# Patient Record
Sex: FEMALE | Race: WHITE | HISPANIC OR LATINO | Employment: FULL TIME | URBAN - METROPOLITAN AREA
[De-identification: names, ages, dates, MRNs, and addresses within clinical notes are randomized per-mention and may not be internally consistent; named-entity substitution may affect disease eponyms.]

---

## 2019-01-29 ENCOUNTER — APPOINTMENT (OUTPATIENT)
Dept: RADIOLOGY | Facility: MEDICAL CENTER | Age: 29
End: 2019-01-29
Attending: EMERGENCY MEDICINE
Payer: MEDICAID

## 2019-01-29 ENCOUNTER — HOSPITAL ENCOUNTER (EMERGENCY)
Facility: MEDICAL CENTER | Age: 29
End: 2019-01-29
Attending: EMERGENCY MEDICINE
Payer: MEDICAID

## 2019-01-29 VITALS
OXYGEN SATURATION: 99 % | SYSTOLIC BLOOD PRESSURE: 104 MMHG | TEMPERATURE: 98.4 F | HEIGHT: 61 IN | HEART RATE: 80 BPM | RESPIRATION RATE: 16 BRPM | BODY MASS INDEX: 20.56 KG/M2 | DIASTOLIC BLOOD PRESSURE: 65 MMHG | WEIGHT: 108.91 LBS
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
## 2019-01-29 DIAGNOSIS — S20.222A CONTUSION OF UPPER BACK, LEFT, INITIAL ENCOUNTER: ICD-10-CM

## 2019-01-29 DIAGNOSIS — S40.012A CONTUSION OF LEFT SHOULDER, INITIAL ENCOUNTER: ICD-10-CM

## 2019-01-29 PROCEDURE — 99284 EMERGENCY DEPT VISIT MOD MDM: CPT

## 2019-01-29 PROCEDURE — 73030 X-RAY EXAM OF SHOULDER: CPT | Mod: LT

## 2019-01-29 PROCEDURE — A9270 NON-COVERED ITEM OR SERVICE: HCPCS | Performed by: EMERGENCY MEDICINE

## 2019-01-29 PROCEDURE — 71046 X-RAY EXAM CHEST 2 VIEWS: CPT

## 2019-01-29 PROCEDURE — 700102 HCHG RX REV CODE 250 W/ 637 OVERRIDE(OP): Performed by: EMERGENCY MEDICINE

## 2019-01-29 RX ORDER — IBUPROFEN 600 MG/1
600 TABLET ORAL ONCE
Status: COMPLETED | OUTPATIENT
Start: 2019-01-29 | End: 2019-01-29

## 2019-01-29 RX ORDER — IBUPROFEN 600 MG/1
600 TABLET ORAL EVERY 6 HOURS PRN
Qty: 30 TAB | Refills: 0 | Status: SHIPPED | OUTPATIENT
Start: 2019-01-29

## 2019-01-29 RX ADMIN — IBUPROFEN 600 MG: 600 TABLET ORAL at 14:17

## 2019-01-29 ASSESSMENT — LIFESTYLE VARIABLES: DO YOU DRINK ALCOHOL: NO

## 2019-01-29 NOTE — ED TRIAGE NOTES
Chief Complaint   Patient presents with   • Fall     hit mid back on sink counter   • Back Pain     mid   • Shoulder Pain     left     Pt ambulated to triage, per pt she slipped and fell hitting her back on sink counter. No loc.

## 2019-01-29 NOTE — ED NOTES
Pt to and from xray in stable condition. Pt medicated per MAR.   
Reviewed discharge instructions, pt verbalized understanding of instructions and medication. States she will  meds as rx'd. Denies further questions at this time. Pt ambulatory out of ER with stable gait.     
right wrist pain/yes (describe)

## 2019-01-29 NOTE — ED PROVIDER NOTES
ED Provider Note    Scribed for Lay Horvath M.D. by Marvel Edwards. 1/29/2019  1:31 PM    Primary care provider: Pcp Pt States None  Means of arrival: Walk in  History obtained from: Patient  History limited by: None     CHIEF COMPLAINT  Chief Complaint   Patient presents with   • Fall     hit mid back on sink counter   • Back Pain     mid   • Shoulder Pain     left       HPI  Marika Melendez is a 28 y.o. female who presents to the Emergency Department for evaluation of left shoulder pain and left upper back pain onset 2 days ago. She states the pain began following a fall when she was standing on the edge of a tub, slipped, and hit her back on the sink. The patient reports no head trauma or loss of consciousness at that time. She has not taken any Motrin or iced the area for alleviation. The patient denies associated limited range of motion.     REVIEW OF SYSTEMS  Musculoskeletal: Positive for left shoulder pain and left upper back pain. Negative for limited range of motion.     See history of present illness. E.    PAST MEDICAL HISTORY   has a past medical history of Prenatal care insufficient with unknown SALO (5/3/2016).    SURGICAL HISTORY   has a past surgical history that includes edin by laparoscopy (10/30/2012); tonsillectomy; and primary c section (5/20/2016).    SOCIAL HISTORY  Social History   Substance Use Topics   • Smoking status: Never Smoker   • Smokeless tobacco: Never Used   • Alcohol use No      History   Drug Use   • Types: Methamphetamines, Inhaled     Comment: Last used 1/2016       FAMILY HISTORY  History reviewed. No pertinent family history.    CURRENT MEDICATIONS  Home Medications     Reviewed by Tala Mccoy R.N. (Registered Nurse) on 01/29/19 at 1250  Med List Status: Complete   Medication Last Dose Status        Patient Jaison Taking any Medications                       ALLERGIES  No Known Allergies    PHYSICAL EXAM  VITAL SIGNS: /67   Pulse 85   Temp 36.7 °C (98.1 °F)  "(Temporal)   Resp 16   Ht 1.549 m (5' 1\")   Wt 49.4 kg (108 lb 14.5 oz)   LMP 01/29/2019   SpO2 100%   BMI 20.58 kg/m²     Constitutional: Well developed, Well nourished, No acute distress, Non-toxic appearance.   HEENT: Normocephalic, Atraumatic,  external ears normal, pharynx pink,  Mucous  Membranes moist, No rhinorrhea or mucosal edema  Eyes: PERRL, EOMI, Conjunctiva normal, No discharge.   Neck: Normal range of motion, No tenderness, Supple, No stridor.   Lymphatic: No lymphadenopathy    Cardiovascular: Regular Rate and Rhythm, No murmurs,  rubs, or gallops.   Thorax & Lungs: Lungs clear to auscultation bilaterally, No respiratory distress, No wheezes, rhales or rhonchi, No chest wall tenderness or crepitus.   Abdomen: Bowel sounds normal, Soft, non tender, non distended,  No pulsatile masses., no rebound guarding or peritoneal signs.   Skin: Warm, Dry, No erythema, No rash, No contusions or abrasions.   Extremities: Equal, intact distal pulses, No cyanosis, No tenderness.   Musculoskeletal: Good range of motion in all major joints. No tenderness to palpation or major deformities noted. Tenderness over left scapula area, normal range of motion, non tender TLS spine.  Neurologic: Alert & awake, no focal deficits. Normal  strength.   Psychiatric: Affect normal      DIAGNOSTIC STUDIES / PROCEDURES    RADIOLOGY  DX-SHOULDER 2+ LEFT   Final Result      1.  There is no acute displaced fracture of the left shoulder.      DX-CHEST-2 VIEWS   Final Result      Unremarkable two view chest.        The radiologist's interpretation of all radiological studies have been reviewed by me.    COURSE & MEDICAL DECISION MAKING  Nursing notes, VS, PMSFHx reviewed in chart.     1:31 PM - Patient seen and examined at bedside. Patient will be treated with Motrin 600 mg. Ordered DX-Shoulder 2+ left and DX-Chest 2 views to evaluate her symptoms.     2:27 PM - Patient was reevaluated at bedside. She is resting comfortably with " stable vital signs. Discussed radiology results with the patient that show no sign of fracture. The patient was made aware her symptoms are consistent with a contusion. She will be prescribed Motrin for discharge and recommended to get plenty of rest. The patient is understanding and agreeable to discharge.       The patient will return for new or worsening symptoms and is stable at the time of discharge.    DISPOSITION:  Patient will be discharged home in stable condition.    FOLLOW UP:  [address] [address] [phone]  Call in 1 day  to establish care, for recheck      OUTPATIENT MEDICATIONS:  Discharge Medication List as of 1/29/2019  2:38 PM      START taking these medications    Details   ibuprofen (MOTRIN) 600 MG Tab Take 1 Tab by mouth every 6 hours as needed., Disp-30 Tab, R-0, Normal             FINAL IMPRESSION  1. Fall, initial encounter    2. Contusion of left shoulder, initial encounter    3. Contusion of upper back, left, initial encounter          Marvel RAHMAN (Asael), am scribing for, and in the presence of, Lay Horvath M.D..    Electronically signed by: Marvel Edwards (Asael), 1/29/2019    Lay RAHMAN M.D. personally performed the services described in this documentation, as scribed by Marvel Edwards in my presence, and it is both accurate and complete.    E.    The note accurately reflects work and decisions made by me.  Lay Horvath  1/29/2019  5:11 PM

## 2019-02-22 ENCOUNTER — APPOINTMENT (OUTPATIENT)
Dept: RADIOLOGY | Facility: MEDICAL CENTER | Age: 29
End: 2019-02-22
Attending: EMERGENCY MEDICINE
Payer: MEDICAID

## 2019-02-22 ENCOUNTER — HOSPITAL ENCOUNTER (EMERGENCY)
Facility: MEDICAL CENTER | Age: 29
End: 2019-02-23
Attending: EMERGENCY MEDICINE
Payer: MEDICAID

## 2019-02-22 DIAGNOSIS — J18.9 PNEUMONIA OF BOTH LUNGS DUE TO INFECTIOUS ORGANISM, UNSPECIFIED PART OF LUNG: ICD-10-CM

## 2019-02-22 LAB
ALBUMIN SERPL BCP-MCNC: 4.3 G/DL (ref 3.2–4.9)
ALBUMIN/GLOB SERPL: 1.3 G/DL
ALP SERPL-CCNC: 54 U/L (ref 30–99)
ALT SERPL-CCNC: 25 U/L (ref 2–50)
ANION GAP SERPL CALC-SCNC: 9 MMOL/L (ref 0–11.9)
APPEARANCE UR: ABNORMAL
AST SERPL-CCNC: 22 U/L (ref 12–45)
BACTERIA #/AREA URNS HPF: ABNORMAL /HPF
BASOPHILS # BLD AUTO: 0.2 % (ref 0–1.8)
BASOPHILS # BLD: 0.04 K/UL (ref 0–0.12)
BILIRUB SERPL-MCNC: 1.4 MG/DL (ref 0.1–1.5)
BILIRUB UR QL STRIP.AUTO: ABNORMAL
BUN SERPL-MCNC: 12 MG/DL (ref 8–22)
CALCIUM SERPL-MCNC: 9 MG/DL (ref 8.5–10.5)
CHLORIDE SERPL-SCNC: 103 MMOL/L (ref 96–112)
CO2 SERPL-SCNC: 23 MMOL/L (ref 20–33)
COLOR UR: ABNORMAL
CREAT SERPL-MCNC: 0.61 MG/DL (ref 0.5–1.4)
D DIMER PPP IA.FEU-MCNC: <0.4 UG/ML (FEU) (ref 0–0.5)
EKG IMPRESSION: NORMAL
EOSINOPHIL # BLD AUTO: 0.01 K/UL (ref 0–0.51)
EOSINOPHIL NFR BLD: 0.1 % (ref 0–6.9)
EPI CELLS #/AREA URNS HPF: ABNORMAL /HPF
ERYTHROCYTE [DISTWIDTH] IN BLOOD BY AUTOMATED COUNT: 38.8 FL (ref 35.9–50)
GLOBULIN SER CALC-MCNC: 3.2 G/DL (ref 1.9–3.5)
GLUCOSE SERPL-MCNC: 116 MG/DL (ref 65–99)
GLUCOSE UR STRIP.AUTO-MCNC: NEGATIVE MG/DL
HCG UR QL: NEGATIVE
HCT VFR BLD AUTO: 40.7 % (ref 37–47)
HGB BLD-MCNC: 14.1 G/DL (ref 12–16)
HYALINE CASTS #/AREA URNS LPF: ABNORMAL /LPF
IMM GRANULOCYTES # BLD AUTO: 0.04 K/UL (ref 0–0.11)
IMM GRANULOCYTES NFR BLD AUTO: 0.2 % (ref 0–0.9)
KETONES UR STRIP.AUTO-MCNC: 40 MG/DL
LEUKOCYTE ESTERASE UR QL STRIP.AUTO: ABNORMAL
LIPASE SERPL-CCNC: 22 U/L (ref 11–82)
LYMPHOCYTES # BLD AUTO: 1.34 K/UL (ref 1–4.8)
LYMPHOCYTES NFR BLD: 7.8 % (ref 22–41)
MCH RBC QN AUTO: 31.1 PG (ref 27–33)
MCHC RBC AUTO-ENTMCNC: 34.6 G/DL (ref 33.6–35)
MCV RBC AUTO: 89.8 FL (ref 81.4–97.8)
MICRO URNS: ABNORMAL
MONOCYTES # BLD AUTO: 0.62 K/UL (ref 0–0.85)
MONOCYTES NFR BLD AUTO: 3.6 % (ref 0–13.4)
NEUTROPHILS # BLD AUTO: 15.23 K/UL (ref 2–7.15)
NEUTROPHILS NFR BLD: 88.1 % (ref 44–72)
NITRITE UR QL STRIP.AUTO: NEGATIVE
NRBC # BLD AUTO: 0 K/UL
NRBC BLD-RTO: 0 /100 WBC
PH UR STRIP.AUTO: 6 [PH]
PLATELET # BLD AUTO: 197 K/UL (ref 164–446)
PMV BLD AUTO: 11.4 FL (ref 9–12.9)
POTASSIUM SERPL-SCNC: 3.5 MMOL/L (ref 3.6–5.5)
PROT SERPL-MCNC: 7.5 G/DL (ref 6–8.2)
PROT UR QL STRIP: 30 MG/DL
RBC # BLD AUTO: 4.53 M/UL (ref 4.2–5.4)
RBC # URNS HPF: ABNORMAL /HPF
RBC UR QL AUTO: ABNORMAL
SODIUM SERPL-SCNC: 135 MMOL/L (ref 135–145)
SP GR UR REFRACTOMETRY: 1.03
SP GR UR STRIP.AUTO: 1.03
UROBILINOGEN UR STRIP.AUTO-MCNC: 1 MG/DL
WBC # BLD AUTO: 17.3 K/UL (ref 4.8–10.8)
WBC #/AREA URNS HPF: ABNORMAL /HPF

## 2019-02-22 PROCEDURE — 93005 ELECTROCARDIOGRAM TRACING: CPT | Performed by: EMERGENCY MEDICINE

## 2019-02-22 PROCEDURE — 96375 TX/PRO/DX INJ NEW DRUG ADDON: CPT

## 2019-02-22 PROCEDURE — 96365 THER/PROPH/DIAG IV INF INIT: CPT

## 2019-02-22 PROCEDURE — 81001 URINALYSIS AUTO W/SCOPE: CPT

## 2019-02-22 PROCEDURE — 700111 HCHG RX REV CODE 636 W/ 250 OVERRIDE (IP): Performed by: EMERGENCY MEDICINE

## 2019-02-22 PROCEDURE — 81025 URINE PREGNANCY TEST: CPT

## 2019-02-22 PROCEDURE — 99285 EMERGENCY DEPT VISIT HI MDM: CPT

## 2019-02-22 PROCEDURE — 85025 COMPLETE CBC W/AUTO DIFF WBC: CPT

## 2019-02-22 PROCEDURE — 85379 FIBRIN DEGRADATION QUANT: CPT

## 2019-02-22 PROCEDURE — 76775 US EXAM ABDO BACK WALL LIM: CPT

## 2019-02-22 PROCEDURE — 93005 ELECTROCARDIOGRAM TRACING: CPT

## 2019-02-22 PROCEDURE — 87491 CHLMYD TRACH DNA AMP PROBE: CPT

## 2019-02-22 PROCEDURE — 71046 X-RAY EXAM CHEST 2 VIEWS: CPT

## 2019-02-22 PROCEDURE — 700105 HCHG RX REV CODE 258: Performed by: EMERGENCY MEDICINE

## 2019-02-22 PROCEDURE — 36415 COLL VENOUS BLD VENIPUNCTURE: CPT

## 2019-02-22 PROCEDURE — 83690 ASSAY OF LIPASE: CPT

## 2019-02-22 PROCEDURE — 87086 URINE CULTURE/COLONY COUNT: CPT

## 2019-02-22 PROCEDURE — 80053 COMPREHEN METABOLIC PANEL: CPT

## 2019-02-22 PROCEDURE — 87591 N.GONORRHOEAE DNA AMP PROB: CPT

## 2019-02-22 RX ORDER — KETOROLAC TROMETHAMINE 30 MG/ML
15 INJECTION, SOLUTION INTRAMUSCULAR; INTRAVENOUS ONCE
Status: COMPLETED | OUTPATIENT
Start: 2019-02-22 | End: 2019-02-22

## 2019-02-22 RX ORDER — SODIUM CHLORIDE 9 MG/ML
1000 INJECTION, SOLUTION INTRAVENOUS ONCE
Status: COMPLETED | OUTPATIENT
Start: 2019-02-22 | End: 2019-02-22

## 2019-02-22 RX ADMIN — CEFTRIAXONE SODIUM 1 G: 1 INJECTION, POWDER, FOR SOLUTION INTRAMUSCULAR; INTRAVENOUS at 22:26

## 2019-02-22 RX ADMIN — FENTANYL CITRATE 50 MCG: 50 INJECTION, SOLUTION INTRAMUSCULAR; INTRAVENOUS at 20:55

## 2019-02-22 RX ADMIN — SODIUM CHLORIDE 1000 ML: 9 INJECTION, SOLUTION INTRAVENOUS at 20:55

## 2019-02-22 RX ADMIN — KETOROLAC TROMETHAMINE 15 MG: 30 INJECTION INTRAMUSCULAR; INTRAVENOUS at 20:55

## 2019-02-22 ASSESSMENT — PAIN DESCRIPTION - DESCRIPTORS
DESCRIPTORS: ACHING;MISERABLE
DESCRIPTORS: MISERABLE

## 2019-02-23 VITALS
OXYGEN SATURATION: 99 % | BODY MASS INDEX: 19.55 KG/M2 | SYSTOLIC BLOOD PRESSURE: 94 MMHG | TEMPERATURE: 98.5 F | WEIGHT: 106.26 LBS | RESPIRATION RATE: 16 BRPM | DIASTOLIC BLOOD PRESSURE: 58 MMHG | HEART RATE: 77 BPM | HEIGHT: 62 IN

## 2019-02-23 LAB
C TRACH DNA SPEC QL NAA+PROBE: NEGATIVE
N GONORRHOEA DNA SPEC QL NAA+PROBE: NEGATIVE
SPECIMEN SOURCE: NORMAL

## 2019-02-23 PROCEDURE — 700117 HCHG RX CONTRAST REV CODE 255: Performed by: EMERGENCY MEDICINE

## 2019-02-23 PROCEDURE — 74177 CT ABD & PELVIS W/CONTRAST: CPT

## 2019-02-23 RX ORDER — BENZONATATE 100 MG/1
100 CAPSULE ORAL 3 TIMES DAILY PRN
Qty: 21 CAP | Refills: 0 | Status: SHIPPED | OUTPATIENT
Start: 2019-02-23 | End: 2019-03-02

## 2019-02-23 RX ORDER — AZITHROMYCIN 250 MG/1
TABLET, FILM COATED ORAL
Qty: 6 TAB | Refills: 0 | Status: SHIPPED | OUTPATIENT
Start: 2019-02-23

## 2019-02-23 RX ADMIN — IOHEXOL 100 ML: 350 INJECTION, SOLUTION INTRAVENOUS at 00:04

## 2019-02-23 NOTE — DISCHARGE INSTRUCTIONS
You have pneumonia. Fill your antibiotic prescription and cough medication prescription this morning.  It is important to hydrate with clear fluids such as water or Gatorade.  Avoid caffeine, alcohol, tobacco or any smoking, or any recreational drugs.  Finish your antibiotics even if you feel better first.  Return to the emergency department for worsening symptoms, if you are not gradually improving over the next couple of days.

## 2019-02-23 NOTE — ED TRIAGE NOTES
"Marika Melendez  Chief Complaint   Patient presents with   • Cough   • Flank Pain   • Abdominal Pain     Pt w/c to triage with above complaint. Pt states she had cough for approx 1.5 wks now. Pt reports dry, non-productive cough. Pt states this morning after waking up, she had abd pain and RIGHT flank pain. Pt states now pain is increased significantly upon inspiration and when coughing. Pt tachycardic at 120, EKG done in triage. Bilateral BP taken, RUE 94/49 and LUE 94/62.    BP (!) 94/58   Pulse (!) 120   Temp 36.9 °C (98.5 °F) (Temporal)   Resp 16   Ht 1.575 m (5' 2\")   Wt 48.2 kg (106 lb 4.2 oz)   LMP 02/22/2019   SpO2 96%   BMI 19.44 kg/m²     Pt informed of triage process and encouraged to notify staff of any changes or concerns. Pt verbalized understanding of instructions. Apologized for long wait time. Pt placed back in lobby.     "

## 2019-02-23 NOTE — ED NOTES
Discharge instructions reviewed. Questions answered. Pt aware rx's sent to pharmacy. Pt provided with work noted. Ambulatory to exit.

## 2019-02-23 NOTE — ED PROVIDER NOTES
"ED Provider Note    Scribed for Vicente Erickson M.D. by Vicente Erickson. 2/22/2019,  8:32 PM.    CHIEF COMPLAINT  Chief Complaint   Patient presents with   • Cough   • Flank Pain   • Abdominal Pain       HPI  Marika Melendez is a 28 y.o. female who presents to the Emergency Department  for right upper abdominal and flank pain, onset after about a week and a half of a dry, nonproductive cough.  She reports that the flank and belly pain started this morning after she woke up.  She says the pain is increased significantly throughout the day, and is made worse by deep inspiration and when coughing.  She does report strong smelling urine, but no dysuria.  She has not had fevers.  She denies daily medications or medication allergies.  She did not have relief at home from ibuprofen.    REVIEW OF SYSTEMS  See HPI for further details. All other systems are negative.     PAST MEDICAL HISTORY   has a past medical history of Prenatal care insufficient with unknown SALO (5/3/2016).    SOCIAL HISTORY  Social History     Social History Main Topics   • Smoking status: Never Smoker   • Smokeless tobacco: Never Used   • Alcohol use No   • Drug use: Yes     Types: Methamphetamines, Inhaled      Comment: Last used 1/2016   • Sexual activity: Not on file     History   Drug Use   • Types: Methamphetamines, Inhaled     Comment: Last used 1/2016       SURGICAL HISTORY   has a past surgical history that includes edin by laparoscopy (10/30/2012); tonsillectomy; and primary c section (5/20/2016).    CURRENT MEDICATIONS  Home Medications     Reviewed by Lamar Galvez R.N. (Registered Nurse) on 02/22/19 at 1911  Med List Status: <None>   Medication Last Dose Status   ibuprofen (MOTRIN) 600 MG Tab  Active                ALLERGIES  No Known Allergies    PHYSICAL EXAM  VITAL SIGNS: BP (!) 94/58   Pulse 77   Temp 36.9 °C (98.5 °F) (Temporal)   Resp 16   Ht 1.575 m (5' 2\")   Wt 48.2 kg (106 lb 4.2 oz)   LMP 02/22/2019   SpO2 99%   " BMI 19.44 kg/m²   Pulse ox interpretation: I interpret this pulse ox as normal.  Constitutional: Alert in n moderate distress.  Appears uncomfortable.    HENT: No signs of trauma, Bilateral external ears normal, Nose normal.   Eyes: Conjunctiva normal, Non-icteric.   Neck: Normal range of motion, Supple, No stridor.   Lymphatic: No lymphadenopathy noted.   Cardiovascular: Regular rate and rhythm, no murmurs.   Thorax & Lungs: Occasional weak dry cough.  Lung sounds are clear, but decreased on the right compared to the left, especially at the right base.  Abdomen: Bowel sounds normal, Soft, No tenderness, No masses, No pulsatile masses. No peritoneal signs.  Skin: Warm, Dry, No erythema, No rash.   Back: No CVA tenderness.  Extremities: Intact distal pulses, No edema, No cyanosis.  Musculoskeletal: Good range of motion in all major joints. No or major deformities noted.   Neurologic: Alert , Normal motor function, Normal sensory function, No focal deficits noted.   Psychiatric: Affect anxious, judgment normal, Mood normal.     DIAGNOSTIC STUDIES / PROCEDURES    LABS  Labs Reviewed   CBC WITH DIFFERENTIAL - Abnormal; Notable for the following:        Result Value    WBC 17.3 (*)     Neutrophils-Polys 88.10 (*)     Lymphocytes 7.80 (*)     Neutrophils (Absolute) 15.23 (*)     All other components within normal limits   COMP METABOLIC PANEL - Abnormal; Notable for the following:     Potassium 3.5 (*)     Glucose 116 (*)     All other components within normal limits   URINALYSIS,CULTURE IF INDICATED - Abnormal; Notable for the following:     Character Cloudy (*)     Ketones 40 (*)     Protein 30 (*)     Bilirubin Small (*)     Leukocyte Esterase Trace (*)     Occult Blood Small (*)     All other components within normal limits   URINE MICROSCOPIC (W/UA) - Abnormal; Notable for the following:     WBC 20-50 (*)     RBC 2-5 (*)     Bacteria Moderate (*)     Hyaline Cast 3-5 (*)     All other components within normal limits    LIPASE   HCG QUALITATIVE UR   ESTIMATED GFR   REFRACTOMETER SG   URINE CULTURE(NEW)   CHLAMYDIA/GC PCR URINE OR SWAB   D-DIMER     All labs reviewed by me.    RADIOLOGY  CT-ABDOMEN-PELVIS WITH   Final Result         1.  Bilateral pulmonary infiltrates, greater on the right   2.  Prominent vascular structures surrounding the uterus, appearance suggests pelvic congestion syndrome.   3.  Hepatomegaly      US-RENAL   Final Result         1.  Normal renal ultrasound.      DX-CHEST-2 VIEWS   Final Result         1.  No acute cardiopulmonary disease.        The radiologist's interpretation of all radiological studies have been reviewed by me.    COURSE & MEDICAL DECISION MAKING  Nursing notes, VS, PMSFHx reviewed in chart.     8:32 PM Patient seen and examined at bedside. Differential diagnosis includes but is not limited to pneumonia, pneumothorax, PE, pancreatitis, gastritis, constipation, not cholecystitis due to history of cholecystectomy. Ordered for labs, per protocol were ordered from triage, and I have added an x-ray to evaluate decreased right sided breath sounds. Patient will be treated with Toradol and fentanyl for her symptoms.     10:38 PM Other than a leukocytosis of 17, the patient's labs are fairly unremarkable, including a negative d-dimer.  Her urinalysis does show significant evidence of infection.  Though her heart rate has normalized, she did seem to be in significant discomfort initially, with a moderate to severe tachycardia, and I do not have a definitive diagnosis at this point, so I think a CT of the abdomen and pelvis is indicated, and I have ordered it.    HYDRATION: Based on the patient's presentation of Tachycardia the patient was given IV fluids. IV Hydration was used because oral hydration was not as rapid as required. Upon recheck following hydration, the patient was improved, with normalized heart rate..     This patient's CT of the abdomen and pelvis actually demonstrated pneumonia.   This is consistent with the patient's cough.  I think her right-sided upper abdominal and flank pain is consistent with diaphragmatic irritation secondary to the CT proven pneumonia.  She has never been febrile.  She does have a moderate leukocytosis.  Her initial tachycardia has resolved.   She has never been hypoxic. There is no evidence of sepsis.  She is appropriate for outpatient treatment.  She will be discharged with appropriate prescriptions and return precautions.   She received an initial 2 g of IV ceftriaxone in the emergency department.     The patient will return for new or worsening symptoms and is stable at the time of discharge.    The patient is referred to a primary physician for blood pressure management, diabetic screening, and for all other preventative health concerns.      DISPOSITION:  Patient will be discharged home in stable condition.    FOLLOW UP:  [facility], Emergency Dept  [address] [address] [phone]    If symptoms worsen      OUTPATIENT MEDICATIONS:  Discharge Medication List as of 2/23/2019 12:30 AM      START taking these medications    Details   azithromycin (ZITHROMAX) 250 MG Tab Take two tabs by mouth on day one, then one tab by mouth daily on days 2-5., Disp-6 Tab, R-0, Normal      benzonatate (TESSALON) 100 MG Cap Take 1 Cap by mouth 3 times a day as needed for Cough for up to 7 days., Disp-21 Cap, R-0, Normal               FINAL IMPRESSION  1. Pneumonia of both lungs due to infectious organism, unspecified part of lung

## 2019-02-25 LAB
BACTERIA UR CULT: NORMAL
SIGNIFICANT IND 70042: NORMAL
SITE SITE: NORMAL
SOURCE SOURCE: NORMAL

## 2020-06-17 ENCOUNTER — OFFICE VISIT (OUTPATIENT)
Dept: URGENT CARE | Facility: CLINIC | Age: 30
End: 2020-06-17
Payer: MEDICAID

## 2020-06-17 VITALS
TEMPERATURE: 98.7 F | DIASTOLIC BLOOD PRESSURE: 52 MMHG | BODY MASS INDEX: 23.6 KG/M2 | HEIGHT: 61 IN | SYSTOLIC BLOOD PRESSURE: 102 MMHG | WEIGHT: 125 LBS | HEART RATE: 58 BPM | OXYGEN SATURATION: 98 %

## 2020-06-17 DIAGNOSIS — K62.5 BRBPR (BRIGHT RED BLOOD PER RECTUM): ICD-10-CM

## 2020-06-17 DIAGNOSIS — R10.9 ABDOMINAL PAIN, UNSPECIFIED ABDOMINAL LOCATION: ICD-10-CM

## 2020-06-17 PROCEDURE — 99203 OFFICE O/P NEW LOW 30 MIN: CPT | Performed by: PHYSICIAN ASSISTANT

## 2020-06-17 ASSESSMENT — ENCOUNTER SYMPTOMS
DIARRHEA: 0
PALPITATIONS: 0
SHORTNESS OF BREATH: 0
BLOOD IN STOOL: 1
BRUISES/BLEEDS EASILY: 0
MYALGIAS: 0
VOMITING: 0
FEVER: 0
PHOTOPHOBIA: 0
DIZZINESS: 0
FLANK PAIN: 0
ABDOMINAL PAIN: 1
COUGH: 0
CONSTIPATION: 0
CHILLS: 0
NAUSEA: 0
BLURRED VISION: 0
SORE THROAT: 0
HEARTBURN: 0
BACK PAIN: 0

## 2020-06-17 ASSESSMENT — FIBROSIS 4 INDEX: FIB4 SCORE: 0.65

## 2020-06-18 NOTE — PROGRESS NOTES
"Subjective:      Marika Melendez is a 29 y.o. female who presents with Abdominal Pain    HPI:  This is a new problem. Marika Melendez is a 29 y.o. female who presents today for evaluation of abdominal pain.  Patient thinks she may have a hernia.  Patient states that for over a month she has had intermittent abdominal pain in her upper abdomen bilaterally.  States that she really only notices the pain at night and gets worse when she lies on her side.  When she changes position it goes away.  She says that her brother recently had surgery for an abdominal hernia and told her that this may be was causing her symptoms as well.  She says that sometimes she notices a \"bulge\".  She has not had any fever/chills, change in bowel habits, or nausea/vomiting.  Patient does report that for the past month or so she has had a little bit of bright red blood on the toilet tissue after bowel movement but denies black/tarry stools.  She denies history of hemorrhoids.  Patient does have history of  section and lap edin in 2016 and  respectively        Review of Systems   Constitutional: Negative for chills, fever and malaise/fatigue.   HENT: Negative for congestion and sore throat.    Eyes: Negative for blurred vision and photophobia.   Respiratory: Negative for cough and shortness of breath.    Cardiovascular: Negative for chest pain and palpitations.   Gastrointestinal: Positive for abdominal pain and blood in stool. Negative for constipation, diarrhea, heartburn, melena, nausea and vomiting.   Genitourinary: Negative for dysuria, flank pain, frequency and urgency.   Musculoskeletal: Negative for back pain and myalgias.   Skin: Negative for rash.   Neurological: Negative for dizziness.   Endo/Heme/Allergies: Does not bruise/bleed easily.       PMH:  has a past medical history of Prenatal care insufficient with unknown SALO (5/3/2016). She also has no past medical history of Blood transfusion without reported diagnosis.  MEDS: " "  Current Outpatient Medications:   •  azithromycin (ZITHROMAX) 250 MG Tab, Take two tabs by mouth on day one, then one tab by mouth daily on days 2-5., Disp: 6 Tab, Rfl: 0  •  ibuprofen (MOTRIN) 600 MG Tab, Take 1 Tab by mouth every 6 hours as needed., Disp: 30 Tab, Rfl: 0  ALLERGIES: No Known Allergies  SURGHX:   Past Surgical History:   Procedure Laterality Date   • PRIMARY C SECTION  5/20/2016    Procedure: PRIMARY C SECTION;  Surgeon: Myra Mcallister M.D.;  Location: LABOR AND DELIVERY;  Service:    • SOLEDAD BY LAPAROSCOPY  10/30/2012    Performed by Geo Barron M.D. at SURGERY [facility] ORS   • TONSILLECTOMY       SOCHX:  reports that she has never smoked. She has never used smokeless tobacco. She reports previous drug use. Drugs: Methamphetamines and Inhaled. She reports that she does not drink alcohol.  FH: Family history was reviewed, no pertinent findings to report     Objective:     /52   Pulse (!) 58   Temp 37.1 °C (98.7 °F)   Ht 1.549 m (5' 1\")   Wt 56.7 kg (125 lb)   SpO2 98%   BMI 23.62 kg/m²      Physical Exam  Constitutional:       Appearance: Normal appearance. She is well-developed.   HENT:      Head: Normocephalic and atraumatic.      Right Ear: External ear normal.      Left Ear: External ear normal.   Eyes:      Conjunctiva/sclera: Conjunctivae normal.      Pupils: Pupils are equal, round, and reactive to light.   Cardiovascular:      Rate and Rhythm: Normal rate and regular rhythm.      Heart sounds: No murmur.   Pulmonary:      Effort: Pulmonary effort is normal.      Breath sounds: Normal breath sounds.   Abdominal:      General: Abdomen is flat. Bowel sounds are normal.      Palpations: Abdomen is soft.      Tenderness: There is no abdominal tenderness.      Hernia: No hernia is present.          Comments: Abdomen is flat, soft, and nontender to palpation.  Bowel sounds are normal.  No guarding or rebound.  No evidence to suggest ventral or umbilical hernia.  Patient does " have small surgical incisions from her lap edin in 2012.  Those areas are also nontender.   Genitourinary:     Comments: Patient declined rectal exam today.  Skin:     General: Skin is warm and dry.      Capillary Refill: Capillary refill takes less than 2 seconds.   Neurological:      Mental Status: She is alert and oriented to person, place, and time.   Psychiatric:         Behavior: Behavior normal.         Judgment: Judgment normal.            Assessment/Plan:     1. Abdominal pain, unspecified abdominal location  - REFERRAL TO GASTROENTEROLOGY    2. BRBPR (bright red blood per rectum)  - REFERRAL TO GASTROENTEROLOGY      Patient reports over 1 month of intermittent abdominal pain as well as mild bright red blood per rectum when wiping after bowel movement.  Physical exam unremarkable.  Will place referral for gastroenterology to follow-up.  Discussed with patient that if she develops severe abdominal pain, fever, nausea/vomiting, black/tarry stool, or increased rectal bleeding she would need to report to the emergency department for further evaluation.

## 2020-07-09 ENCOUNTER — HOSPITAL ENCOUNTER (EMERGENCY)
Facility: MEDICAL CENTER | Age: 30
End: 2020-07-09
Payer: MEDICAID

## 2020-07-09 VITALS
SYSTOLIC BLOOD PRESSURE: 95 MMHG | BODY MASS INDEX: 23.62 KG/M2 | DIASTOLIC BLOOD PRESSURE: 67 MMHG | RESPIRATION RATE: 18 BRPM | OXYGEN SATURATION: 98 % | HEART RATE: 85 BPM | HEIGHT: 61 IN | TEMPERATURE: 97.7 F

## 2020-07-09 PROCEDURE — 302449 STATCHG TRIAGE ONLY (STATISTIC)

## 2021-07-01 ENCOUNTER — HOSPITAL ENCOUNTER (EMERGENCY)
Facility: MEDICAL CENTER | Age: 31
End: 2021-07-02
Attending: EMERGENCY MEDICINE
Payer: MEDICAID

## 2021-07-01 DIAGNOSIS — N83.209 RUPTURED OVARIAN CYST: ICD-10-CM

## 2021-07-01 PROCEDURE — 99284 EMERGENCY DEPT VISIT MOD MDM: CPT

## 2021-07-01 PROCEDURE — 83690 ASSAY OF LIPASE: CPT

## 2021-07-01 PROCEDURE — 84703 CHORIONIC GONADOTROPIN ASSAY: CPT

## 2021-07-01 PROCEDURE — 85025 COMPLETE CBC W/AUTO DIFF WBC: CPT

## 2021-07-01 PROCEDURE — 80053 COMPREHEN METABOLIC PANEL: CPT

## 2021-07-02 ENCOUNTER — APPOINTMENT (OUTPATIENT)
Dept: RADIOLOGY | Facility: MEDICAL CENTER | Age: 31
End: 2021-07-02
Attending: EMERGENCY MEDICINE
Payer: MEDICAID

## 2021-07-02 VITALS
SYSTOLIC BLOOD PRESSURE: 104 MMHG | HEART RATE: 115 BPM | TEMPERATURE: 98.5 F | HEIGHT: 62 IN | RESPIRATION RATE: 16 BRPM | BODY MASS INDEX: 22.64 KG/M2 | OXYGEN SATURATION: 96 % | WEIGHT: 123.02 LBS | DIASTOLIC BLOOD PRESSURE: 60 MMHG

## 2021-07-02 LAB
ALBUMIN SERPL BCP-MCNC: 5 G/DL (ref 3.2–4.9)
ALBUMIN/GLOB SERPL: 1.8 G/DL
ALP SERPL-CCNC: 85 U/L (ref 30–99)
ALT SERPL-CCNC: 81 U/L (ref 2–50)
ANION GAP SERPL CALC-SCNC: 14 MMOL/L (ref 7–16)
APPEARANCE UR: CLEAR
AST SERPL-CCNC: 44 U/L (ref 12–45)
BASOPHILS # BLD AUTO: 0.4 % (ref 0–1.8)
BASOPHILS # BLD: 0.05 K/UL (ref 0–0.12)
BILIRUB SERPL-MCNC: 1.2 MG/DL (ref 0.1–1.5)
BILIRUB UR QL STRIP.AUTO: NEGATIVE
BUN SERPL-MCNC: 19 MG/DL (ref 8–22)
CALCIUM SERPL-MCNC: 9.2 MG/DL (ref 8.5–10.5)
CHLORIDE SERPL-SCNC: 105 MMOL/L (ref 96–112)
CO2 SERPL-SCNC: 21 MMOL/L (ref 20–33)
COLOR UR: YELLOW
CREAT SERPL-MCNC: 0.63 MG/DL (ref 0.5–1.4)
EKG IMPRESSION: NORMAL
EOSINOPHIL # BLD AUTO: 0.03 K/UL (ref 0–0.51)
EOSINOPHIL NFR BLD: 0.2 % (ref 0–6.9)
ERYTHROCYTE [DISTWIDTH] IN BLOOD BY AUTOMATED COUNT: 42.5 FL (ref 35.9–50)
GLOBULIN SER CALC-MCNC: 2.8 G/DL (ref 1.9–3.5)
GLUCOSE SERPL-MCNC: 114 MG/DL (ref 65–99)
GLUCOSE UR STRIP.AUTO-MCNC: NEGATIVE MG/DL
HCG SERPL QL: NEGATIVE
HCT VFR BLD AUTO: 42.9 % (ref 37–47)
HGB BLD-MCNC: 14.3 G/DL (ref 12–16)
IMM GRANULOCYTES # BLD AUTO: 0.07 K/UL (ref 0–0.11)
IMM GRANULOCYTES NFR BLD AUTO: 0.5 % (ref 0–0.9)
KETONES UR STRIP.AUTO-MCNC: NEGATIVE MG/DL
LEUKOCYTE ESTERASE UR QL STRIP.AUTO: NEGATIVE
LIPASE SERPL-CCNC: 35 U/L (ref 11–82)
LYMPHOCYTES # BLD AUTO: 1 K/UL (ref 1–4.8)
LYMPHOCYTES NFR BLD: 7.2 % (ref 22–41)
MCH RBC QN AUTO: 30.9 PG (ref 27–33)
MCHC RBC AUTO-ENTMCNC: 33.3 G/DL (ref 33.6–35)
MCV RBC AUTO: 92.7 FL (ref 81.4–97.8)
MICRO URNS: ABNORMAL
MONOCYTES # BLD AUTO: 0.68 K/UL (ref 0–0.85)
MONOCYTES NFR BLD AUTO: 4.9 % (ref 0–13.4)
NEUTROPHILS # BLD AUTO: 12.1 K/UL (ref 2–7.15)
NEUTROPHILS NFR BLD: 86.8 % (ref 44–72)
NITRITE UR QL STRIP.AUTO: NEGATIVE
NRBC # BLD AUTO: 0 K/UL
NRBC BLD-RTO: 0 /100 WBC
PH UR STRIP.AUTO: 6.5 [PH] (ref 5–8)
PLATELET # BLD AUTO: 184 K/UL (ref 164–446)
PMV BLD AUTO: 11.6 FL (ref 9–12.9)
POTASSIUM SERPL-SCNC: 3.7 MMOL/L (ref 3.6–5.5)
PROT SERPL-MCNC: 7.8 G/DL (ref 6–8.2)
PROT UR QL STRIP: NEGATIVE MG/DL
RBC # BLD AUTO: 4.63 M/UL (ref 4.2–5.4)
RBC UR QL AUTO: NEGATIVE
SODIUM SERPL-SCNC: 140 MMOL/L (ref 135–145)
SP GR UR STRIP.AUTO: >=1.045
UROBILINOGEN UR STRIP.AUTO-MCNC: 0.2 MG/DL
WBC # BLD AUTO: 13.9 K/UL (ref 4.8–10.8)

## 2021-07-02 PROCEDURE — 74177 CT ABD & PELVIS W/CONTRAST: CPT

## 2021-07-02 PROCEDURE — 81003 URINALYSIS AUTO W/O SCOPE: CPT

## 2021-07-02 PROCEDURE — 700117 HCHG RX CONTRAST REV CODE 255: Performed by: EMERGENCY MEDICINE

## 2021-07-02 PROCEDURE — 93005 ELECTROCARDIOGRAM TRACING: CPT | Performed by: STUDENT IN AN ORGANIZED HEALTH CARE EDUCATION/TRAINING PROGRAM

## 2021-07-02 RX ADMIN — IOHEXOL 90 ML: 350 INJECTION, SOLUTION INTRAVENOUS at 01:50

## 2021-07-02 ASSESSMENT — PAIN DESCRIPTION - PAIN TYPE: TYPE: ACUTE PAIN

## 2021-07-02 NOTE — ED NOTES
DC instructions reviewed with pt. Pt verbalized understanding. Pt ambulated to [facility] with steady gait.

## 2021-07-02 NOTE — ED PROVIDER NOTES
"ED Provider Note    CHIEF COMPLAINT  Chief Complaint   Patient presents with   • Abdominal Pain     Pt complains of mid ab pain and bloody stools starting today. Pt complains of \"having a ball-like\" feeling in her stomach x 1 year       HPI  Patient is a 31  y.o female  female (hx of  ), w/ PMH of Laparoscopic cholecystectomy in  who presents to the ED complaining of abdominal pain, w/ two episodes of bloody bowel movements. Patient states that she has had this chronic abdominal pain for one year now. She feels like there is a \"ball like mass\" just below the sternum. She had it evaluated before at an outside facility (likely urgent care), but they said it was nothing to worry about. She states that it feels tender to touch. The pain worsens with ambulation, or when her kids lay on top of her abdominal region. She did have a bloody bowel movement just prior to arrival. She does have a history of hard stools, and was straining. She didn't look to see if there was blood in the toilet bowl, but there was tony blood on wiping. She had another episode of a bloody BM at [facility]. She has had similar episodes in the past. Currently denies any fevers/chills. No nausea/vomiting. She does not know if she has a history of hemorrhoids or anal fissues. She is not currently on her menstrual period.     REVIEW OF SYSTEMS  See HPI for further details. All other systems are negative.     PAST MEDICAL HISTORY   has a past medical history of Prenatal care insufficient with unknown SALO (5/3/2016).    SOCIAL HISTORY  Social History     Tobacco Use   • Smoking status: Never Smoker   • Smokeless tobacco: Never Used   Vaping Use   • Vaping Use: Never used   Substance and Sexual Activity   • Alcohol use: Yes     Comment: occ    • Drug use: Not Currently     Types: Methamphetamines, Inhaled     Comment: meth last use 6 years ago 2016   • Sexual activity: Yes     Partners: Male       SURGICAL HISTORY   has a past surgical " "history that includes edin by laparoscopy (10/30/2012); tonsillectomy; and primary c section (5/20/2016).    CURRENT MEDICATIONS  Home Medications    **Home medications have not yet been reviewed for this encounter**         ALLERGIES  No Known Allergies    PHYSICAL EXAM  VITAL SIGNS: /60   Pulse (!) 115   Temp 36.9 °C (98.5 °F)   Resp 16   Ht 1.575 m (5' 2\")   Wt 55.8 kg (123 lb 0.3 oz)   LMP 06/17/2021   SpO2 96%   Breastfeeding No Comment: not on birth control   BMI 22.50 kg/m²  @[id number]::@   Pulse ox interpretation: I interpret this pulse ox as normal.  Constitutional: Alert in no apparent distress.  HENT: No signs of trauma, Bilateral external ears normal, Nose normal.   Eyes: Pupils are equal and reactive, Conjunctiva normal, Non-icteric.   Neck: Normal range of motion, No tenderness, Supple, No stridor.   Cardiovascular: Regular rate and rhythm, no murmurs.   Thorax & Lungs: Normal breath sounds, No respiratory distress, No wheezing, No chest tenderness.   Abdomen: Bowel sounds normal, Soft, Palpable mass in the substernal region which is minimally tender to palpation. No peritoneal signs.  Skin: Warm, Dry, No erythema, No rash.   Back: No bony tenderness, No CVA tenderness.   Extremities: Intact distal pulses, No edema, No tenderness, No cyanosis,  Negative Reyna's sign.   Neurologic: Alert , Normal motor function, Normal sensory function, No focal deficits noted.   Psychiatric: Affect normal, Judgment normal, Mood normal.       DIAGNOSTIC STUDIES / PROCEDURES    EKG  Not obtained     LABS  Results for orders placed or performed during the hospital encounter of 07/01/21   CBC WITH DIFFERENTIAL   Result Value Ref Range    WBC 13.9 (H) 4.8 - 10.8 K/uL    RBC 4.63 4.20 - 5.40 M/uL    Hemoglobin 14.3 12.0 - 16.0 g/dL    Hematocrit 42.9 37.0 - 47.0 %    MCV 92.7 81.4 - 97.8 fL    MCH 30.9 27.0 - 33.0 pg    MCHC 33.3 (L) 33.6 - 35.0 g/dL    RDW 42.5 35.9 - 50.0 fL    Platelet Count 184 164 - 446 " "K/uL    MPV 11.6 9.0 - 12.9 fL    Neutrophils-Polys 86.80 (H) 44.00 - 72.00 %    Lymphocytes 7.20 (L) 22.00 - 41.00 %    Monocytes 4.90 0.00 - 13.40 %    Eosinophils 0.20 0.00 - 6.90 %    Basophils 0.40 0.00 - 1.80 %    Immature Granulocytes 0.50 0.00 - 0.90 %    Nucleated RBC 0.00 /100 WBC    Neutrophils (Absolute) 12.10 (H) 2.00 - 7.15 K/uL    Lymphs (Absolute) 1.00 1.00 - 4.80 K/uL    Monos (Absolute) 0.68 0.00 - 0.85 K/uL    Eos (Absolute) 0.03 0.00 - 0.51 K/uL    Baso (Absolute) 0.05 0.00 - 0.12 K/uL    Immature Granulocytes (abs) 0.07 0.00 - 0.11 K/uL    NRBC (Absolute) 0.00 K/uL   COMP METABOLIC PANEL   Result Value Ref Range    Sodium 140 135 - 145 mmol/L    Potassium 3.7 3.6 - 5.5 mmol/L    Chloride 105 96 - 112 mmol/L    Co2 21 20 - 33 mmol/L    Anion Gap 14.0 7.0 - 16.0    Glucose 114 (H) 65 - 99 mg/dL    Bun 19 8 - 22 mg/dL    Creatinine 0.63 0.50 - 1.40 mg/dL    Calcium 9.2 8.5 - 10.5 mg/dL    AST(SGOT) 44 12 - 45 U/L    ALT(SGPT) 81 (H) 2 - 50 U/L    Alkaline Phosphatase 85 30 - 99 U/L    Total Bilirubin 1.2 0.1 - 1.5 mg/dL    Albumin 5.0 (H) 3.2 - 4.9 g/dL    Total Protein 7.8 6.0 - 8.2 g/dL    Globulin 2.8 1.9 - 3.5 g/dL    A-G Ratio 1.8 g/dL   LIPASE   Result Value Ref Range    Lipase 35 11 - 82 U/L   HCG QUAL SERUM   Result Value Ref Range    Beta-Hcg Qualitative Serum Negative Negative   ESTIMATED GFR   Result Value Ref Range    GFR If African American >60 >60 mL/min/1.73 m 2    GFR If Non African American >60 >60 mL/min/1.73 m 2       RADIOLOGY  CT-ABDOMEN-PELVIS WITH   Final Result      Ruptured right ovarian cyst with small pelvic free fluid, within normal physiologic limits      Cholecystectomy without biliary dilatation            COURSE & MEDICAL DECISION MAKING  Pertinent Labs & Imaging studies reviewed. (See chart for details)  Patient presented with one year history of vague abdominal pain with \"mass\" in the substernal region. Chronic in nature. She also has had two bloody bowel movements " in the setting of constipation vs possible internal hemorrhoids. CBC obtained along with UA. Given her leukocytosis and tachycardia, patient meets 2/4 SIRS criteria. CT A/P obtained. CT showing references. Fluid likely causing her pain. Otherwise patient is resting comfortably. I do not believe that this likely represents an ongoing hemorrhagic cyst and bleed. Patient is understanding of strict return precautions outpatient follow-up with gynecology as referred. She is to call them tomorrow. Understanding return precautions over the weekend. Laboratory evaluation otherwise as noted above.    FINAL IMPRESSION  1. Ruptured ovarian cyst            Electronically signed by: Ed Reyes M.D., 7/2/2021 12:07 AM

## 2022-04-24 ENCOUNTER — HOSPITAL ENCOUNTER (EMERGENCY)
Facility: MEDICAL CENTER | Age: 32
End: 2022-04-25
Attending: EMERGENCY MEDICINE
Payer: MEDICAID

## 2022-04-24 ENCOUNTER — APPOINTMENT (OUTPATIENT)
Dept: RADIOLOGY | Facility: MEDICAL CENTER | Age: 32
End: 2022-04-24
Attending: EMERGENCY MEDICINE
Payer: MEDICAID

## 2022-04-24 DIAGNOSIS — K43.9 VENTRAL HERNIA WITHOUT OBSTRUCTION OR GANGRENE: Primary | ICD-10-CM

## 2022-04-24 LAB — HCG UR QL: NEGATIVE

## 2022-04-24 PROCEDURE — 99284 EMERGENCY DEPT VISIT MOD MDM: CPT

## 2022-04-24 PROCEDURE — 81025 URINE PREGNANCY TEST: CPT

## 2022-04-24 ASSESSMENT — ENCOUNTER SYMPTOMS
BLURRED VISION: 0
FEVER: 0
SORE THROAT: 0
CHILLS: 0
FALLS: 0
SHORTNESS OF BREATH: 0
COUGH: 0
DOUBLE VISION: 0
ABDOMINAL PAIN: 1
NECK PAIN: 0
VOMITING: 0
HEADACHES: 0
NAUSEA: 0
LOSS OF CONSCIOUSNESS: 0

## 2022-04-24 ASSESSMENT — FIBROSIS 4 INDEX: FIB4 SCORE: 0.82

## 2022-04-25 VITALS
BODY MASS INDEX: 22.56 KG/M2 | DIASTOLIC BLOOD PRESSURE: 53 MMHG | WEIGHT: 119.49 LBS | HEART RATE: 82 BPM | TEMPERATURE: 97.9 F | SYSTOLIC BLOOD PRESSURE: 94 MMHG | HEIGHT: 61 IN | RESPIRATION RATE: 16 BRPM | OXYGEN SATURATION: 98 %

## 2022-04-25 PROCEDURE — 74019 RADEX ABDOMEN 2 VIEWS: CPT

## 2022-04-25 NOTE — ED TRIAGE NOTES
"Chief Complaint   Patient presents with   • Abdominal Pain     Pt has swelling to upper abdominal area towards the right side and states it feels hard and feels like it might tear if she picks up her son. Pt reports noticing the swelling a year ago and urgent care at the time said it was nothing. Pt reports the pain and swelling has increased within the last couple of weeks and is worse when she eats.      BP (!) 97/65   Pulse 88   Temp 37 °C (98.6 °F) (Temporal)   Resp 16   Ht 1.549 m (5' 1\")   Wt 54.2 kg (119 lb 7.8 oz)   SpO2 98%   BMI 22.58 kg/m²     "

## 2022-04-25 NOTE — ED PROVIDER NOTES
"ED Provider Note    Chief Complaint:   Abdominal pain    HPI:  Marika Melendez is a very pleasant 31-year-old female with past medical history of cholecystectomy presents with 7 months of an abdominal \"ball\".  Patient ports she went to urgent care 7 months ago and was told that nothing was wrong after the mass was pushed.  Patient reports that she has been experiencing worsening pain that is worse with bearing down, lifting of her son.  Patient reports some pain when she eats as well.  Patient reports the pain is tearing, moderate intensity, nonradiating.  Patient has not attempted home pain medication for symptom control.  Patient denies constipation, difficulty passing stool or flatus, severe abdominal pain.    Review of Systems:  Review of Systems   Constitutional: Negative for chills and fever.   HENT: Negative for congestion and sore throat.    Eyes: Negative for blurred vision and double vision.   Respiratory: Negative for cough and shortness of breath.    Cardiovascular: Negative for chest pain and leg swelling.   Gastrointestinal: Positive for abdominal pain. Negative for nausea and vomiting.   Genitourinary: Negative for dysuria and hematuria.   Musculoskeletal: Negative for falls and neck pain.   Skin: Negative for itching and rash.   Neurological: Negative for loss of consciousness and headaches.       Past Medical History:   has a past medical history of Prenatal care insufficient with unknown SALO (5/3/2016).    Social History:  Social History     Tobacco Use   • Smoking status: Never Smoker   • Smokeless tobacco: Never Used   Vaping Use   • Vaping Use: Never used   Substance and Sexual Activity   • Alcohol use: Yes     Comment: occ    • Drug use: Not Currently     Types: Methamphetamines, Inhaled     Comment: meth last use 6 years ago 2016   • Sexual activity: Yes     Partners: Male       Surgical History:   has a past surgical history that includes edin by laparoscopy (10/30/2012); tonsillectomy; and " "primary c section (5/20/2016).    Allergies:  No Known Allergies    Physical Exam:  Vital Signs: BP (!) 97/65   Pulse 88   Temp 37 °C (98.6 °F) (Temporal)   Resp 16   Ht 1.549 m (5' 1\")   Wt 54.2 kg (119 lb 7.8 oz)   SpO2 98%   BMI 22.58 kg/m²   Physical Exam  Vitals and nursing note reviewed.   Constitutional:       Comments: Patient is lying in bed supine, pleasant, conversant, speaking in complete sentences   HENT:      Head: Normocephalic and atraumatic.   Eyes:      Extraocular Movements: Extraocular movements intact.      Conjunctiva/sclera: Conjunctivae normal.      Pupils: Pupils are equal, round, and reactive to light.   Cardiovascular:      Pulses: Normal pulses.      Comments: HR 88  Pulmonary:      Effort: Pulmonary effort is normal. No respiratory distress.   Abdominal:      Comments: Epigastric ventral abdominal mass which is reducible, no overlying skin changes, minimal tenderness to palpation, no crepitus, abdomen is soft, non-tender, non-distended, non-rigid, no rebound, guarding, no McBurney's point tenderness, no peritoneal signs, negative Rovsing sign, negative Hall sign.  No CVA tenderness to palpation.    Musculoskeletal:         General: No swelling. Normal range of motion.      Cervical back: Normal range of motion. No rigidity.   Skin:     General: Skin is warm and dry.      Capillary Refill: Capillary refill takes less than 2 seconds.   Neurological:      Mental Status: She is alert.         Medical records reviewed for continuity of care.     Results for orders placed or performed during the hospital encounter of 04/24/22   HCG QUALITATIVE UR   Result Value Ref Range    Beta-Hcg Urine Negative Negative       Radiology:  [id number]   Final Result      Nonobstructive bowel gas pattern. Small to moderate amount of stool throughout the colon.           MDM:  Patient is presenting with a reducible abdominal hernia, no evidence of strangulation or incarceration.  Patient " deferring pain medication at this time.  Patient passing stool, no rigid abdomen, no guarding, peritonitis, small bowel obstruction, perforation are inconsistent with patient presentation at this time.  UPT to evaluate for pregnancy.  X-ray to evaluate for perforation versus small bowel obstruction.  Disposition pending x-ray imaging.    Electronically signed by: Himanshu Owens M.D., 4/24/2022, 11:03 PM    X-ray imaging demonstrates no evidence of obstruction or perforation.  Patient has reducible hernia on reevaluation.  Patient given follow-up with Dr. Champion of general surgery and a referral has been placed.  Patient counseled return emergency department should she experience fevers, chills, worsening abdominal pain, constipation, rigidity.  Patient endorses chronically low blood pressure, this is unchanged from baseline.    Repeat physical exam benign.  I doubt any serious emergency process at this time.  Patient and/or family, friends given strict return precautions and care instructions. They have demonstrated understanding of discharge instructions through teach back mechanism. Advised PCP follow-up in 1-2 days.  Patient/family/friend expresses understanding and agrees to plan.    This dictation has been created using voice recognition software. I am continuously working with the software to minimize the number of voice recognition errors and I have made every attempt to manually correct the errors within my dictation. However errors  related to this voice recognition software may still exist and should be interpreted within the appropriate context.     Electronically signed by: Himanshu Owens M.D., 4/25/2022 12:17 AM      Disposition:  Home    Final Impression:  1. Ventral hernia without obstruction or gangrene

## 2022-04-25 NOTE — ED NOTES
Patient stable on discharge.Discharge education provided. Patient verbalizes understanding. No medication changes or prescriptions. Patient left ambulatory, with , via private vehicle.

## 2022-10-13 ENCOUNTER — HOSPITAL ENCOUNTER (OUTPATIENT)
Dept: RADIOLOGY | Facility: MEDICAL CENTER | Age: 32
End: 2022-10-13
Attending: SURGERY
Payer: MEDICAID

## 2022-10-13 DIAGNOSIS — K43.2 INCISIONAL HERNIA, WITHOUT OBSTRUCTION OR GANGRENE: ICD-10-CM

## 2022-10-13 PROCEDURE — 76705 ECHO EXAM OF ABDOMEN: CPT

## 2023-09-23 ENCOUNTER — HOSPITAL ENCOUNTER (EMERGENCY)
Facility: MEDICAL CENTER | Age: 33
End: 2023-09-23
Payer: MEDICAID

## 2023-09-23 ENCOUNTER — APPOINTMENT (OUTPATIENT)
Dept: RADIOLOGY | Facility: MEDICAL CENTER | Age: 33
End: 2023-09-23
Attending: EMERGENCY MEDICINE
Payer: MEDICAID

## 2023-09-23 ENCOUNTER — HOSPITAL ENCOUNTER (EMERGENCY)
Facility: MEDICAL CENTER | Age: 33
End: 2023-09-24
Attending: EMERGENCY MEDICINE
Payer: MEDICAID

## 2023-09-23 VITALS
BODY MASS INDEX: 22.27 KG/M2 | OXYGEN SATURATION: 99 % | SYSTOLIC BLOOD PRESSURE: 97 MMHG | WEIGHT: 121.03 LBS | RESPIRATION RATE: 16 BRPM | DIASTOLIC BLOOD PRESSURE: 56 MMHG | TEMPERATURE: 97.5 F | HEART RATE: 89 BPM | HEIGHT: 62 IN

## 2023-09-23 DIAGNOSIS — K43.9 VENTRAL HERNIA WITHOUT OBSTRUCTION OR GANGRENE: ICD-10-CM

## 2023-09-23 LAB
ALBUMIN SERPL BCP-MCNC: 4.3 G/DL (ref 3.2–4.9)
ALBUMIN/GLOB SERPL: 2 G/DL
ALP SERPL-CCNC: 82 U/L (ref 30–99)
ALT SERPL-CCNC: 25 U/L (ref 2–50)
ANION GAP SERPL CALC-SCNC: 10 MMOL/L (ref 7–16)
AST SERPL-CCNC: 23 U/L (ref 12–45)
BASOPHILS # BLD AUTO: 0.4 % (ref 0–1.8)
BASOPHILS # BLD: 0.03 K/UL (ref 0–0.12)
BILIRUB SERPL-MCNC: 0.5 MG/DL (ref 0.1–1.5)
BUN SERPL-MCNC: 18 MG/DL (ref 8–22)
CALCIUM ALBUM COR SERPL-MCNC: 8.7 MG/DL (ref 8.5–10.5)
CALCIUM SERPL-MCNC: 8.9 MG/DL (ref 8.5–10.5)
CHLORIDE SERPL-SCNC: 106 MMOL/L (ref 96–112)
CO2 SERPL-SCNC: 23 MMOL/L (ref 20–33)
CREAT SERPL-MCNC: 0.59 MG/DL (ref 0.5–1.4)
EOSINOPHIL # BLD AUTO: 0.38 K/UL (ref 0–0.51)
EOSINOPHIL NFR BLD: 5.3 % (ref 0–6.9)
ERYTHROCYTE [DISTWIDTH] IN BLOOD BY AUTOMATED COUNT: 45.1 FL (ref 35.9–50)
GFR SERPLBLD CREATININE-BSD FMLA CKD-EPI: 122 ML/MIN/1.73 M 2
GLOBULIN SER CALC-MCNC: 2.2 G/DL (ref 1.9–3.5)
GLUCOSE SERPL-MCNC: 106 MG/DL (ref 65–99)
HCG SERPL QL: NEGATIVE
HCT VFR BLD AUTO: 37.9 % (ref 37–47)
HGB BLD-MCNC: 12.7 G/DL (ref 12–16)
IMM GRANULOCYTES # BLD AUTO: 0.03 K/UL (ref 0–0.11)
IMM GRANULOCYTES NFR BLD AUTO: 0.4 % (ref 0–0.9)
LIPASE SERPL-CCNC: 77 U/L (ref 11–82)
LYMPHOCYTES # BLD AUTO: 2.24 K/UL (ref 1–4.8)
LYMPHOCYTES NFR BLD: 31 % (ref 22–41)
MCH RBC QN AUTO: 30.5 PG (ref 27–33)
MCHC RBC AUTO-ENTMCNC: 33.5 G/DL (ref 32.2–35.5)
MCV RBC AUTO: 90.9 FL (ref 81.4–97.8)
MONOCYTES # BLD AUTO: 0.43 K/UL (ref 0–0.85)
MONOCYTES NFR BLD AUTO: 5.9 % (ref 0–13.4)
NEUTROPHILS # BLD AUTO: 4.12 K/UL (ref 1.82–7.42)
NEUTROPHILS NFR BLD: 57 % (ref 44–72)
NRBC # BLD AUTO: 0 K/UL
NRBC BLD-RTO: 0 /100 WBC (ref 0–0.2)
PLATELET # BLD AUTO: 224 K/UL (ref 164–446)
PMV BLD AUTO: 12 FL (ref 9–12.9)
POTASSIUM SERPL-SCNC: 4.1 MMOL/L (ref 3.6–5.5)
PROT SERPL-MCNC: 6.5 G/DL (ref 6–8.2)
RBC # BLD AUTO: 4.17 M/UL (ref 4.2–5.4)
SODIUM SERPL-SCNC: 139 MMOL/L (ref 135–145)
WBC # BLD AUTO: 7.2 K/UL (ref 4.8–10.8)

## 2023-09-23 PROCEDURE — 36415 COLL VENOUS BLD VENIPUNCTURE: CPT

## 2023-09-23 PROCEDURE — 84703 CHORIONIC GONADOTROPIN ASSAY: CPT

## 2023-09-23 PROCEDURE — 80053 COMPREHEN METABOLIC PANEL: CPT

## 2023-09-23 PROCEDURE — 99283 EMERGENCY DEPT VISIT LOW MDM: CPT

## 2023-09-23 PROCEDURE — 85025 COMPLETE CBC W/AUTO DIFF WBC: CPT

## 2023-09-23 PROCEDURE — 83690 ASSAY OF LIPASE: CPT

## 2023-09-23 PROCEDURE — 302449 STATCHG TRIAGE ONLY (STATISTIC)

## 2023-09-24 VITALS
WEIGHT: 121.03 LBS | RESPIRATION RATE: 17 BRPM | OXYGEN SATURATION: 96 % | TEMPERATURE: 97.8 F | HEART RATE: 90 BPM | BODY MASS INDEX: 22.27 KG/M2 | SYSTOLIC BLOOD PRESSURE: 101 MMHG | DIASTOLIC BLOOD PRESSURE: 55 MMHG | HEIGHT: 62 IN

## 2023-09-24 PROCEDURE — 74177 CT ABD & PELVIS W/CONTRAST: CPT

## 2023-09-24 PROCEDURE — 700117 HCHG RX CONTRAST REV CODE 255: Mod: UD | Performed by: EMERGENCY MEDICINE

## 2023-09-24 RX ADMIN — IOHEXOL 95 ML: 350 INJECTION, SOLUTION INTRAVENOUS at 00:30

## 2023-09-24 NOTE — ED NOTES
Pt discharged home. GCS 15. IV discontinued and gauze placed, pt in possession of belongings. Pt provided discharge education and information pertaining to follow up appointments. Pt received copy of discharge instructions and verbalized understanding.     Vitals:    09/24/23 0000   BP: 101/55   Pulse: 90   Resp: 17   Temp: 36.6 °C (97.8 °F)   SpO2: 96%

## 2023-09-24 NOTE — ED TRIAGE NOTES
"Chief Complaint   Patient presents with    Abdominal Pain     X 1 day, pt states she had a sudden onset of pain around hernia incision (repair in February). Pt states she believes that her hernia is returning. Pt denies pain at this time.   -N/V       32 yo F to triage for above complaint.      Pt placed in lobby. Pt educated on triage process. Pt encouraged to alert staff for any changes.     Patient and staff wearing appropriate PPE    BP 97/56   Pulse 89   Temp 36.4 °C (97.5 °F) (Temporal)   Resp 16   Ht 1.575 m (5' 2\")   Wt 54.9 kg (121 lb 0.5 oz)   SpO2 99%   BMI 22.14 kg/m²     "

## 2023-09-24 NOTE — DISCHARGE INSTRUCTIONS
Return the emergency if you have increasing abdominal pain, severe vomiting, stop passing gas or fever.

## 2023-09-24 NOTE — ED NOTES
Pt was attempted to be called by nurse and ED tech again. Was informed by triage pt had left without informing staff. Pt will be discharged from system.

## 2023-09-24 NOTE — ED NOTES
Checked on bed, connected to monitor,  with unlabored respirations. Vital signs is stable.   Denied any new complaints. No current needs identified.  Gurney in low position, side rail up for pt safety. Call light within reach.

## 2023-09-24 NOTE — ED PROVIDER NOTES
ER Provider Note    Scribed for Roland Melendrez M.d. by Lisseth Molina. 9/23/2023  9:36 PM    Primary Care Provider: None noted    CHIEF COMPLAINT  Chief Complaint   Patient presents with    Hernia     Pt had hernia repair in February. Pt states she feels hernia is worsening and reports sudden onset of pain yesterday. Pt denies pain at this time. -N/V     EXTERNAL RECORDS REVIEWED  Outpatient Notes Patient was seen on 2/9/23 for an open ventral hernia repair at [facility].     HPI/ROS  LIMITATION TO HISTORY   None  OUTSIDE HISTORIAN(S):  None    Marika Melendez is a 33 y.o. female who presents to the ED complaining of hernia pain onset yesterday. Patient reports she had a hernia repair in February. She states it feels like her hernia is worsening. Patient reports an episode of shortness of breath and difficulty breathing last night which lasted ten minutes. Patient denies doing any heavy lifting. Patient denies any pain at this time. Patient denies changes in bowel movements and is still passing gas. Denies nausea or vomiting. No alleviating or exacerbating factors noted.     PAST MEDICAL HISTORY  Past Medical History:   Diagnosis Date    Prenatal care insufficient with unknown SALO 5/3/2016     SURGICAL HISTORY  Past Surgical History:   Procedure Laterality Date    PRIMARY C SECTION  5/20/2016    Procedure: PRIMARY C SECTION;  Surgeon: Myra Mcallister M.D.;  Location: LABOR AND DELIVERY;  Service:     SOLEDAD BY LAPAROSCOPY  10/30/2012    Performed by Geo Barron M.D. at SURGERY [facility]    TONSILLECTOMY       FAMILY HISTORY  No pertinent family history    SOCIAL HISTORY   reports that she has never smoked. She has never used smokeless tobacco. She reports current alcohol use. She reports that she does not currently use drugs after having used the following drugs: Methamphetamines and Inhaled.    CURRENT MEDICATIONS  Previous Medications    AZITHROMYCIN (ZITHROMAX) 250 MG TAB    Take two  "tabs by mouth on day one, then one tab by mouth daily on days 2-5.    IBUPROFEN (MOTRIN) 600 MG TAB    Take 1 Tab by mouth every 6 hours as needed.     ALLERGIES  Patient has no known allergies.    PHYSICAL EXAM  /78   Pulse 74   Temp 36.9 °C (98.4 °F) (Temporal)   Resp 16   Ht 1.575 m (5' 2\")   Wt 54.9 kg (121 lb 0.5 oz)   SpO2 98%   BMI 22.14 kg/m²   Constitutional: Well developed, Well nourished, mild distress.   HENT: Normocephalic, Atraumatic.   Eyes: Conjunctiva normal, No discharge.   Cardiovascular: Normal heart rate, Normal rhythm, No murmurs, equal pulses.   Pulmonary: Normal breath sounds, No respiratory distress, No wheezing, No rales, No rhonchi.  Abdomen:Soft, no rebound, no guarding. Epigastric tenderness. Possible ventral wall hernia which is unreducable. Mild tenderness in the abdomen.   Back: No CVA tenderness.   Musculoskeletal: No major deformities noted, No tenderness.   Skin: Warm, Dry, No erythema, No rash.   Neurologic: Alert & oriented x 3, Normal motor function,  No focal deficits noted.   Psychiatric: Affect normal, Judgment normal, Mood normal.     DIAGNOSTIC STUDIES  Labs:   Results for orders placed or performed during the hospital encounter of 09/23/23   HCG QUAL SERUM   Result Value Ref Range    Beta-Hcg Qualitative Serum Negative Negative   CBC WITH DIFFERENTIAL   Result Value Ref Range    WBC 7.2 4.8 - 10.8 K/uL    RBC 4.17 (L) 4.20 - 5.40 M/uL    Hemoglobin 12.7 12.0 - 16.0 g/dL    Hematocrit 37.9 37.0 - 47.0 %    MCV 90.9 81.4 - 97.8 fL    MCH 30.5 27.0 - 33.0 pg    MCHC 33.5 32.2 - 35.5 g/dL    RDW 45.1 35.9 - 50.0 fL    Platelet Count 224 164 - 446 K/uL    MPV 12.0 9.0 - 12.9 fL    Neutrophils-Polys 57.00 44.00 - 72.00 %    Lymphocytes 31.00 22.00 - 41.00 %    Monocytes 5.90 0.00 - 13.40 %    Eosinophils 5.30 0.00 - 6.90 %    Basophils 0.40 0.00 - 1.80 %    Immature Granulocytes 0.40 0.00 - 0.90 %    Nucleated RBC 0.00 0.00 - 0.20 /100 WBC    Neutrophils (Absolute) " 4.12 1.82 - 7.42 K/uL    Lymphs (Absolute) 2.24 1.00 - 4.80 K/uL    Monos (Absolute) 0.43 0.00 - 0.85 K/uL    Eos (Absolute) 0.38 0.00 - 0.51 K/uL    Baso (Absolute) 0.03 0.00 - 0.12 K/uL    Immature Granulocytes (abs) 0.03 0.00 - 0.11 K/uL    NRBC (Absolute) 0.00 K/uL   COMP METABOLIC PANEL   Result Value Ref Range    Sodium 139 135 - 145 mmol/L    Potassium 4.1 3.6 - 5.5 mmol/L    Chloride 106 96 - 112 mmol/L    Co2 23 20 - 33 mmol/L    Anion Gap 10.0 7.0 - 16.0    Glucose 106 (H) 65 - 99 mg/dL    Bun 18 8 - 22 mg/dL    Creatinine 0.59 0.50 - 1.40 mg/dL    Calcium 8.9 8.5 - 10.5 mg/dL    Correct Calcium 8.7 8.5 - 10.5 mg/dL    AST(SGOT) 23 12 - 45 U/L    ALT(SGPT) 25 2 - 50 U/L    Alkaline Phosphatase 82 30 - 99 U/L    Total Bilirubin 0.5 0.1 - 1.5 mg/dL    Albumin 4.3 3.2 - 4.9 g/dL    Total Protein 6.5 6.0 - 8.2 g/dL    Globulin 2.2 1.9 - 3.5 g/dL    A-G Ratio 2.0 g/dL   LIPASE   Result Value Ref Range    Lipase 77 11 - 82 U/L   ESTIMATED GFR   Result Value Ref Range    GFR (CKD-EPI) 122 >60 mL/min/1.73 m 2     Radiology:   The attending emergency physician has independently interpreted the diagnostic imaging associated with this visit and am waiting the final reading from the radiologist.   Preliminary interpretation is a follows: CT shows a small ventral hernia.  I do not see any obvious mesh.  There is no bowel involved or small bowel obstruction  Radiologist interpretation:   CT-ABDOMEN-PELVIS WITH   Final Result      1.  Ventral abdominal wall hernia is again noted which is slightly larger than on the prior CT. The hernia contains only abdominal fat and no bowel loops.      2.  Post cholecystectomy.      3.  No acute abnormalities are identified in the abdomen or pelvis.        COURSE & MEDICAL DECISION MAKING   ED Observation Status? Yes; I am placing the patient in to an observation status due to a diagnostic uncertainty as well as therapeutic intensity. Patient placed in observation status at 9:45 PM,  9/23/2023.     Observation plan is as follows: We will manage their symptoms, evaluate with diagnostic testing, and then reassess after results are reviewed     Upon Reevaluation, the patient's condition has: Improved; and will be discharged.    Patient discharged from ED Observation status at 12:30 AM (Time) 9/24/2023  (Date).     INITIAL ASSESSMENT, COURSE AND PLAN  Care Narrative:   9:45 PM- Patient was seen and evaluated at bedside. Patient presents to the ED for hernia pain onset yesterday. On exam patient had a mildly tender abdomen with a possible ventral hernia after my exam, I discussed with the patient the plan of care, which includes obtaining lab work and imaging for further evaluation. Patient understands and verbalizes agreement to plan of care. Ordered CT-abdomen-pelvis, lipase, HCG qual serum, CBC w/ diff and CMP to evaluate. Differential diagnoses include but not limited to: incarcerated hernia vs hernia repair failure    PROBLEM LIST  Problem #1 ventral wall hernia at this point time it appears the patient's hernia repair is failed.  At this point time there is small amount of fat in her ventral wall hernia.  Discussed with her she will need to follow back up with her general surgeon.  There is no signs of bowel involvement or small bowel obstruction.  Discussed using ibuprofen and Tylenol for pain.    DISPOSITION AND DISCUSSIONS  I have discussed management of the patient with the following physicians and VANDANA's: None    Discussion of management with other QHP or appropriate source(s): None     Barriers to care at this time, including but not limited to: Patient does not have established PCP.       FINAL DIANGOSIS  1. Ventral hernia without obstruction or gangrene           The note accurately reflects work and decisions made by me.  Roland Melendrez M.D.  9/24/2023  12:34 AM

## 2023-09-24 NOTE — ED NOTES
Bedside report received from JEFFERSON Young. Pt AAOx4, GCS 15, breathing is even and unlabored on room air. Patient hooked up with monitor. Pt low risk for fall.

## 2023-09-24 NOTE — ED TRIAGE NOTES
"Chief Complaint   Patient presents with    Hernia     Pt had hernia repair in February. Pt states she feels hernia is worsening and reports sudden onset of pain yesterday. Pt denies pain at this time. -N/V       34 yo F to triage for above complaint.      Pt placed in lobby. Pt educated on triage process. Pt encouraged to alert staff for any changes.     Patient and staff wearing appropriate PPE    /78   Pulse 74   Temp 36.9 °C (98.4 °F) (Temporal)   Resp 16   Ht 1.575 m (5' 2\")   Wt 54.9 kg (121 lb 0.5 oz)   SpO2 98%   BMI 22.14 kg/m²     "

## 2023-09-24 NOTE — ED NOTES
Pt ambulatory back to room  Yellow 54 with steady gait. Pt placed into gown and placed on the monitor.  Pt states same complaints in triage. Alert oriented not in acute respiratory distress noted.  Chart up for ERP to see.

## 2024-05-14 ENCOUNTER — HOSPITAL ENCOUNTER (EMERGENCY)
Facility: MEDICAL CENTER | Age: 34
End: 2024-05-14

## 2024-05-14 ENCOUNTER — HOSPITAL ENCOUNTER (EMERGENCY)
Facility: MEDICAL CENTER | Age: 34
End: 2024-05-15
Attending: EMERGENCY MEDICINE

## 2024-05-14 ENCOUNTER — APPOINTMENT (OUTPATIENT)
Dept: URGENT CARE | Facility: CLINIC | Age: 34
End: 2024-05-14

## 2024-05-14 ENCOUNTER — APPOINTMENT (OUTPATIENT)
Dept: RADIOLOGY | Facility: MEDICAL CENTER | Age: 34
End: 2024-05-14
Attending: EMERGENCY MEDICINE

## 2024-05-14 DIAGNOSIS — R10.33 ABDOMINAL PAIN, ACUTE, PERIUMBILICAL: ICD-10-CM

## 2024-05-14 DIAGNOSIS — M62.08 DIASTASIS RECTI: ICD-10-CM

## 2024-05-14 LAB
ALBUMIN SERPL BCP-MCNC: 4.9 G/DL (ref 3.2–4.9)
ALBUMIN/GLOB SERPL: 2 G/DL
ALP SERPL-CCNC: 63 U/L (ref 30–99)
ALT SERPL-CCNC: 19 U/L (ref 2–50)
ANION GAP SERPL CALC-SCNC: 14 MMOL/L (ref 7–16)
AST SERPL-CCNC: 18 U/L (ref 12–45)
BASOPHILS # BLD AUTO: 0.4 % (ref 0–1.8)
BASOPHILS # BLD: 0.03 K/UL (ref 0–0.12)
BILIRUB SERPL-MCNC: 1 MG/DL (ref 0.1–1.5)
BUN SERPL-MCNC: 16 MG/DL (ref 8–22)
CALCIUM ALBUM COR SERPL-MCNC: 8.8 MG/DL (ref 8.5–10.5)
CALCIUM SERPL-MCNC: 9.5 MG/DL (ref 8.5–10.5)
CHLORIDE SERPL-SCNC: 107 MMOL/L (ref 96–112)
CO2 SERPL-SCNC: 19 MMOL/L (ref 20–33)
CREAT SERPL-MCNC: 0.4 MG/DL (ref 0.5–1.4)
EOSINOPHIL # BLD AUTO: 0.4 K/UL (ref 0–0.51)
EOSINOPHIL NFR BLD: 4.7 % (ref 0–6.9)
ERYTHROCYTE [DISTWIDTH] IN BLOOD BY AUTOMATED COUNT: 41.2 FL (ref 35.9–50)
GFR SERPLBLD CREATININE-BSD FMLA CKD-EPI: 133 ML/MIN/1.73 M 2
GLOBULIN SER CALC-MCNC: 2.5 G/DL (ref 1.9–3.5)
GLUCOSE SERPL-MCNC: 87 MG/DL (ref 65–99)
HCG SERPL QL: NEGATIVE
HCT VFR BLD AUTO: 38.1 % (ref 37–47)
HGB BLD-MCNC: 13.1 G/DL (ref 12–16)
IMM GRANULOCYTES # BLD AUTO: 0.02 K/UL (ref 0–0.11)
IMM GRANULOCYTES NFR BLD AUTO: 0.2 % (ref 0–0.9)
LIPASE SERPL-CCNC: 50 U/L (ref 11–82)
LYMPHOCYTES # BLD AUTO: 2.72 K/UL (ref 1–4.8)
LYMPHOCYTES NFR BLD: 32.2 % (ref 22–41)
MCH RBC QN AUTO: 31.1 PG (ref 27–33)
MCHC RBC AUTO-ENTMCNC: 34.4 G/DL (ref 32.2–35.5)
MCV RBC AUTO: 90.5 FL (ref 81.4–97.8)
MONOCYTES # BLD AUTO: 0.56 K/UL (ref 0–0.85)
MONOCYTES NFR BLD AUTO: 6.6 % (ref 0–13.4)
NEUTROPHILS # BLD AUTO: 4.72 K/UL (ref 1.82–7.42)
NEUTROPHILS NFR BLD: 55.9 % (ref 44–72)
NRBC # BLD AUTO: 0 K/UL
NRBC BLD-RTO: 0 /100 WBC (ref 0–0.2)
PLATELET # BLD AUTO: 253 K/UL (ref 164–446)
PMV BLD AUTO: 11.2 FL (ref 9–12.9)
POTASSIUM SERPL-SCNC: 3.8 MMOL/L (ref 3.6–5.5)
PROT SERPL-MCNC: 7.4 G/DL (ref 6–8.2)
RBC # BLD AUTO: 4.21 M/UL (ref 4.2–5.4)
SODIUM SERPL-SCNC: 140 MMOL/L (ref 135–145)
WBC # BLD AUTO: 8.5 K/UL (ref 4.8–10.8)

## 2024-05-14 RX ADMIN — IOHEXOL 80 ML: 350 INJECTION, SOLUTION INTRAVENOUS at 23:30

## 2024-05-14 ASSESSMENT — FIBROSIS 4 INDEX: FIB4 SCORE: 0.68

## 2024-05-14 NOTE — Clinical Note
Marika Melendez was seen and treated in our emergency department on 5/14/2024.  She may return to work on 05/17/2024.  Marika Melendez was seen and treated in our emergency department on 5/14/2024. She may return to work on 05/18/2024. No lifting more than 5 pounds for the next 2 weeks If you have any questions or concerns, please don't hesitate to call. Christoph Jaramillo M.D.     If you have any questions or concerns, please don't hesitate to call.      Christoph Jaramillo M.D.

## 2024-05-14 NOTE — Clinical Note
Marika Melendez was seen and treated in our emergency department on 5/14/2024.  She may return to work on 05/18/2024.  No lifting more than 5 pounds for the next 2 weeks     If you have any questions or concerns, please don't hesitate to call.      Christoph Jaramillo M.D.

## 2024-05-15 VITALS
BODY MASS INDEX: 22.76 KG/M2 | HEART RATE: 72 BPM | SYSTOLIC BLOOD PRESSURE: 97 MMHG | DIASTOLIC BLOOD PRESSURE: 55 MMHG | TEMPERATURE: 97.2 F | HEIGHT: 62 IN | OXYGEN SATURATION: 97 % | WEIGHT: 123.68 LBS | RESPIRATION RATE: 18 BRPM

## 2024-05-15 RX ORDER — HYDROCHLOROTHIAZIDE 12.5 MG/1
12.5 CAPSULE, GELATIN COATED ORAL DAILY
Qty: 30 CAPSULE | Refills: 0 | Status: SHIPPED | OUTPATIENT
Start: 2024-05-15 | End: 2024-05-15

## 2024-05-15 NOTE — ED TRIAGE NOTES
"Chief Complaint   Patient presents with    Abdominal Pain     Mid/low Abd pain started a few days ago, but mostly when lifting. Pt states it is related to new hernia      /52   Pulse 85   Temp 36.2 °C (97.1 °F) (Temporal)   Resp 16   Ht 1.575 m (5' 2\")   Wt 56.1 kg (123 lb 10.9 oz)   SpO2 96%   BMI 22.62 kg/m²     Pt here for above cc  Pt states she had a hernia repair in February and that she thinks hernia is protruding again in the same area. Only has abd pain when she is lifting at work. Wants the hernia checked out  -active pain, N/V    Pt to lobby, educated on rooming process   "

## 2024-05-15 NOTE — ED PROVIDER NOTES
ED Provider Note    CHIEF COMPLAINT  Chief Complaint   Patient presents with    Abdominal Pain     Mid/low Abd pain started a few days ago, but mostly when lifting. Pt states it is related to new hernia        EXTERNAL RECORDS REVIEWED  Outpatient Notes reviewed office visit progress note dated June 17, 2020.  Patient seen for abdominal pain reassuring exam without obvious ventral hernia.,  Plan for follow-up with gastroenterology given patient has no blood in bowel movements.    HPI/ROS  LIMITATION TO HISTORY   Select: : None  OUTSIDE HISTORIAN(S):  None available    Marika Melendez is a 33 y.o. female who presents for evaluation of acute abdominal pain.  Patient relates history of ventral hernia, she was undergone surgery for this in February 2023.  No recent procedures but she relates for at least the last week she has had worsening discomfort and palpable swelling when she is lifting both at home and at work.  No vomiting, no nausea, no fever.  Normal bowel movements, no constipation, no diarrhea.  No dysuria, hematuria.  Similar in location and pain when she had problems with the hernia in the past as such she came to be assessed.    PAST MEDICAL HISTORY   has a past medical history of Prenatal care insufficient with unknown SALO (5/3/2016).    SURGICAL HISTORY   has a past surgical history that includes edin by laparoscopy (10/30/2012); tonsillectomy; and primary c section (5/20/2016).    FAMILY HISTORY  History reviewed. No pertinent family history.    SOCIAL HISTORY  Social History     Tobacco Use    Smoking status: Never    Smokeless tobacco: Never   Vaping Use    Vaping Use: Never used   Substance and Sexual Activity    Alcohol use: Yes     Comment: occ     Drug use: Not Currently     Types: Methamphetamines, Inhaled     Comment: meth last use 6 years ago 2016    Sexual activity: Yes     Partners: Male       CURRENT MEDICATIONS  Home Medications       Reviewed by Nisha Mojica R.N. (Registered Nurse)  "on 05/14/24 at 2156  Med List Status: Partial     Medication Last Dose Status   azithromycin (ZITHROMAX) 250 MG Tab  Active   ibuprofen (MOTRIN) 600 MG Tab  Active                    ALLERGIES  No Known Allergies    PHYSICAL EXAM  VITAL SIGNS: BP 97/55   Pulse 72   Temp 36.2 °C (97.2 °F) (Temporal)   Resp 18   Ht 1.575 m (5' 2\")   Wt 56.1 kg (123 lb 10.9 oz)   SpO2 97%   BMI 22.62 kg/m²    General: Alert, no acute distress  Skin: Warm, dry, normal for ethnicity  Head: Normocephalic, atraumatic  Neck: Trachea midline, no tenderness  Eye: PERRL, normal conjunctiva  ENMT: Oral mucosa moist  Cardiovascular: Regular rate and rhythm, No murmur, Normal peripheral perfusion  Respiratory: Lungs CTA, respirations are non-labored, breath sounds are equal  Gastrointestinal: Soft, mild tenderness to the periumbilical/epigastric region.  Subtle palpable swelling noted when patient bears down consistent with not incarcerated reducible incisional hernia  Musculoskeletal: No swelling, no deformity  Neurological: Alert and oriented to person, place, time, and situation  Psychiatric: Cooperative, appropriate mood & affect     EKG/LABS  Results for orders placed or performed during the hospital encounter of 05/14/24   CBC WITH DIFFERENTIAL   Result Value Ref Range    WBC 8.5 4.8 - 10.8 K/uL    RBC 4.21 4.20 - 5.40 M/uL    Hemoglobin 13.1 12.0 - 16.0 g/dL    Hematocrit 38.1 37.0 - 47.0 %    MCV 90.5 81.4 - 97.8 fL    MCH 31.1 27.0 - 33.0 pg    MCHC 34.4 32.2 - 35.5 g/dL    RDW 41.2 35.9 - 50.0 fL    Platelet Count 253 164 - 446 K/uL    MPV 11.2 9.0 - 12.9 fL    Neutrophils-Polys 55.90 44.00 - 72.00 %    Lymphocytes 32.20 22.00 - 41.00 %    Monocytes 6.60 0.00 - 13.40 %    Eosinophils 4.70 0.00 - 6.90 %    Basophils 0.40 0.00 - 1.80 %    Immature Granulocytes 0.20 0.00 - 0.90 %    Nucleated RBC 0.00 0.00 - 0.20 /100 WBC    Neutrophils (Absolute) 4.72 1.82 - 7.42 K/uL    Lymphs (Absolute) 2.72 1.00 - 4.80 K/uL    Monos (Absolute) " 0.56 0.00 - 0.85 K/uL    Eos (Absolute) 0.40 0.00 - 0.51 K/uL    Baso (Absolute) 0.03 0.00 - 0.12 K/uL    Immature Granulocytes (abs) 0.02 0.00 - 0.11 K/uL    NRBC (Absolute) 0.00 K/uL   COMP METABOLIC PANEL   Result Value Ref Range    Sodium 140 135 - 145 mmol/L    Potassium 3.8 3.6 - 5.5 mmol/L    Chloride 107 96 - 112 mmol/L    Co2 19 (L) 20 - 33 mmol/L    Anion Gap 14.0 7.0 - 16.0    Glucose 87 65 - 99 mg/dL    Bun 16 8 - 22 mg/dL    Creatinine 0.40 (L) 0.50 - 1.40 mg/dL    Calcium 9.5 8.5 - 10.5 mg/dL    Correct Calcium 8.8 8.5 - 10.5 mg/dL    AST(SGOT) 18 12 - 45 U/L    ALT(SGPT) 19 2 - 50 U/L    Alkaline Phosphatase 63 30 - 99 U/L    Total Bilirubin 1.0 0.1 - 1.5 mg/dL    Albumin 4.9 3.2 - 4.9 g/dL    Total Protein 7.4 6.0 - 8.2 g/dL    Globulin 2.5 1.9 - 3.5 g/dL    A-G Ratio 2.0 g/dL   LIPASE   Result Value Ref Range    Lipase 50 11 - 82 U/L   HCG Qual Serum   Result Value Ref Range    Beta-Hcg Qualitative Serum Negative Negative   ESTIMATED GFR   Result Value Ref Range    GFR (CKD-EPI) 133 >60 mL/min/1.73 m 2      I have independently interpreted this EKG    RADIOLOGY/PROCEDURES   I have independently interpreted the diagnostic imaging associated with this visit and am waiting the final reading from the radiologist.   My preliminary interpretation is as follows: No evidence of obstruction, no hernia mass    Radiologist interpretation:  CT-ABDOMEN-PELVIS WITH    (Results Pending)       COURSE & MEDICAL DECISION MAKING    ASSESSMENT, COURSE AND PLAN  Care Narrative: Nontoxic well in appearance 33-year-old presents for evaluation of discomfort to the anterior abdomen.  History of previous hernia and repair in February of last year.  She does have some mild swelling on the exam more so when she bears down but does not have any incarcerated hernia mass.  CT is also reassuring with no evidence of any herniated bowel.  I suspect likely diastases recti, I do recommend given she is symptomatic lifting restriction,  "no more than 5 pounds for next 2 weeks to give this a chance to heal.  Thankfully otherwise no evidence of obstruction, she has not been vomiting, she is not febrile, she is not peritoneal on exam of the abdomen.  Labs otherwise reassuring and nonactionable.  No indication as such for surgical intervention or inpatient management.    ED OBS: Yes; I am placing the patient in to an observation status due to a diagnostic uncertainty as well as therapeutic intensity. Patient placed in observation status at 10:25 PM, 5/14/2024.     Observation plan is as follows: Metabolic workup and CT imaging with IV contrast to be obtained.  Differential diagnosis at this point includes but is not restricted to ventral hernia, incisional hernia, incarcerated hernia, bowel obstruction    Upon Reevaluation, the patient's condition has: Improved; and will be discharged.    Patient discharged from ED Observation status at 0017 (Time) 5/15/24 (Date).       Patient Vitals for the past 24 hrs:   BP Temp Temp src Pulse Resp SpO2 Height Weight   05/15/24 0005 97/55 36.2 °C (97.2 °F) Temporal 72 18 97 % -- --   05/15/24 0003 97/55 36.2 °C (97.2 °F) Temporal 73 16 97 % -- --   05/14/24 2335 102/59 -- -- 72 -- 97 % -- --   05/14/24 2305 100/67 -- -- 65 -- 96 % -- --   05/14/24 2153 -- -- -- -- -- -- 1.575 m (5' 2\") 56.1 kg (123 lb 10.9 oz)   05/14/24 2135 106/52 36.2 °C (97.1 °F) Temporal 85 16 96 % -- --        ADDITIONAL PROBLEMS MANAGED  Diastases recti    DISPOSITION AND DISCUSSIONS  I have discussed management of the patient with the following physicians and VANDANA's:  NA    Discussion of management with other QHP or appropriate source(s): None     Escalation of care considered, and ultimately not performed:acute inpatient care management, however at this time, the patient is most appropriate for outpatient management    Barriers to care at this time, including but not limited to: Patient does not have established PCP.     Decision tools and " prescription drugs considered including, but not limited to:  SIRS criteria for sepsis not met .    The patient will return for new or worsening symptoms and is stable at the time of discharge.      DISPOSITION:  Patient will be discharged home in stable condition.    FOLLOW UP:  [address] [place] [id number]  [phone]  Schedule an appointment as soon as possible for a visit         OUTPATIENT MEDICATIONS:  New Prescriptions    No medications on file          FINAL DIAGNOSIS  1. Abdominal pain, acute, periumbilical    2. Diastasis recti           Electronically signed by: Christoph Jaramillo M.D., 5/14/2024 10:24 PM

## 2024-07-09 ENCOUNTER — HOSPITAL ENCOUNTER (OUTPATIENT)
Dept: RADIOLOGY | Facility: MEDICAL CENTER | Age: 34
End: 2024-07-09
Attending: SURGERY

## 2024-07-09 DIAGNOSIS — Z09 RADIOTHERAPY FOLLOW-UP EXAMINATION: ICD-10-CM

## 2024-07-09 PROCEDURE — 76705 ECHO EXAM OF ABDOMEN: CPT

## 2024-09-23 NOTE — ED TRIAGE NOTES
"Chief Complaint   Patient presents with   • Abdominal Pain     Pt complains of mid ab pain and bloody stools starting today. Pt complains of \"having a ball-like\" feeling in her stomach x 1 year       Pt amb to triage with steady gait for above complaint.   Pt is alert and oriented, speaking in full sentences, follows commands and responds appropriately to questions. Resp are even and unlabored. No obvious acute distress.    Pt placed in lobby. Pt educated on triage process. Pt encouraged to alert staff for any changes.    Vitals:    07/01/21 2234   BP: 104/69   Pulse: (!) 115   Resp: 14   Temp: 37.3 °C (99.1 °F)   SpO2: 98%       " PACU

## 2024-12-06 ENCOUNTER — TELEPHONE (OUTPATIENT)
Dept: HEALTH INFORMATION MANAGEMENT | Facility: OTHER | Age: 34
End: 2024-12-06

## 2025-01-28 ENCOUNTER — HOSPITAL ENCOUNTER (OUTPATIENT)
Dept: BEHAVIORAL HEALTH | Facility: MEDICAL CENTER | Age: 35
End: 2025-01-28
Attending: PSYCHIATRY & NEUROLOGY
Payer: COMMERCIAL

## 2025-01-28 DIAGNOSIS — F15.20 METHAMPHETAMINE USE DISORDER, MODERATE (HCC): ICD-10-CM

## 2025-01-28 DIAGNOSIS — F41.1 GAD (GENERALIZED ANXIETY DISORDER): ICD-10-CM

## 2025-01-28 DIAGNOSIS — F33.1 MDD (MAJOR DEPRESSIVE DISORDER), RECURRENT EPISODE, MODERATE (HCC): ICD-10-CM

## 2025-01-28 PROCEDURE — 90791 PSYCH DIAGNOSTIC EVALUATION: CPT | Performed by: MARRIAGE & FAMILY THERAPIST

## 2025-01-28 ASSESSMENT — ANXIETY QUESTIONNAIRES
4. TROUBLE RELAXING: SEVERAL DAYS
7. FEELING AFRAID AS IF SOMETHING AWFUL MIGHT HAPPEN: SEVERAL DAYS
1. FEELING NERVOUS, ANXIOUS, OR ON EDGE: MORE THAN HALF THE DAYS
2. NOT BEING ABLE TO STOP OR CONTROL WORRYING: NEARLY EVERY DAY
6. BECOMING EASILY ANNOYED OR IRRITABLE: NOT AT ALL
GAD7 TOTAL SCORE: 12
3. WORRYING TOO MUCH ABOUT DIFFERENT THINGS: NEARLY EVERY DAY
5. BEING SO RESTLESS THAT IT IS HARD TO SIT STILL: MORE THAN HALF THE DAYS

## 2025-01-28 ASSESSMENT — PATIENT HEALTH QUESTIONNAIRE - PHQ9
CLINICAL INTERPRETATION OF PHQ2 SCORE: 5
5. POOR APPETITE OR OVEREATING: 2 - MORE THAN HALF THE DAYS
SUM OF ALL RESPONSES TO PHQ QUESTIONS 1-9: 18
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
## 2025-06-09 ENCOUNTER — HOSPITAL ENCOUNTER (EMERGENCY)
Facility: MEDICAL CENTER | Age: 35
End: 2025-06-09
Attending: STUDENT IN AN ORGANIZED HEALTH CARE EDUCATION/TRAINING PROGRAM
Payer: COMMERCIAL

## 2025-06-09 VITALS
HEART RATE: 67 BPM | HEIGHT: 62 IN | DIASTOLIC BLOOD PRESSURE: 64 MMHG | TEMPERATURE: 96.7 F | BODY MASS INDEX: 21.83 KG/M2 | WEIGHT: 118.61 LBS | RESPIRATION RATE: 16 BRPM | SYSTOLIC BLOOD PRESSURE: 114 MMHG | OXYGEN SATURATION: 98 %

## 2025-06-09 DIAGNOSIS — R10.10 PAIN OF UPPER ABDOMEN: Primary | ICD-10-CM

## 2025-06-09 DIAGNOSIS — K43.9 VENTRAL HERNIA WITHOUT OBSTRUCTION OR GANGRENE: ICD-10-CM

## 2025-06-09 LAB
ALBUMIN SERPL BCP-MCNC: 4.6 G/DL (ref 3.2–4.9)
ALBUMIN/GLOB SERPL: 1.7 G/DL
ALP SERPL-CCNC: 81 U/L (ref 30–99)
ALT SERPL-CCNC: 9 U/L (ref 2–50)
ANION GAP SERPL CALC-SCNC: 14 MMOL/L (ref 7–16)
APPEARANCE UR: CLEAR
AST SERPL-CCNC: 16 U/L (ref 12–45)
BASOPHILS # BLD AUTO: 0.8 % (ref 0–1.8)
BASOPHILS # BLD: 0.05 K/UL (ref 0–0.12)
BILIRUB SERPL-MCNC: 1.4 MG/DL (ref 0.1–1.5)
BILIRUB UR QL STRIP.AUTO: NEGATIVE
BUN SERPL-MCNC: 16 MG/DL (ref 8–22)
CALCIUM ALBUM COR SERPL-MCNC: 8.7 MG/DL (ref 8.5–10.5)
CALCIUM SERPL-MCNC: 9.2 MG/DL (ref 8.5–10.5)
CHLORIDE SERPL-SCNC: 104 MMOL/L (ref 96–112)
CO2 SERPL-SCNC: 21 MMOL/L (ref 20–33)
COLOR UR: YELLOW
CREAT SERPL-MCNC: 0.76 MG/DL (ref 0.5–1.4)
EOSINOPHIL # BLD AUTO: 0.24 K/UL (ref 0–0.51)
EOSINOPHIL NFR BLD: 3.7 % (ref 0–6.9)
ERYTHROCYTE [DISTWIDTH] IN BLOOD BY AUTOMATED COUNT: 42.3 FL (ref 35.9–50)
GFR SERPLBLD CREATININE-BSD FMLA CKD-EPI: 105 ML/MIN/1.73 M 2
GLOBULIN SER CALC-MCNC: 2.7 G/DL (ref 1.9–3.5)
GLUCOSE SERPL-MCNC: 104 MG/DL (ref 65–99)
GLUCOSE UR STRIP.AUTO-MCNC: NEGATIVE MG/DL
HCG SERPL QL: NEGATIVE
HCT VFR BLD AUTO: 41 % (ref 37–47)
HGB BLD-MCNC: 13.6 G/DL (ref 12–16)
IMM GRANULOCYTES # BLD AUTO: 0.02 K/UL (ref 0–0.11)
IMM GRANULOCYTES NFR BLD AUTO: 0.3 % (ref 0–0.9)
KETONES UR STRIP.AUTO-MCNC: ABNORMAL MG/DL
LEUKOCYTE ESTERASE UR QL STRIP.AUTO: NEGATIVE
LIPASE SERPL-CCNC: 42 U/L (ref 11–82)
LYMPHOCYTES # BLD AUTO: 2.71 K/UL (ref 1–4.8)
LYMPHOCYTES NFR BLD: 42.1 % (ref 22–41)
MCH RBC QN AUTO: 30.4 PG (ref 27–33)
MCHC RBC AUTO-ENTMCNC: 33.2 G/DL (ref 32.2–35.5)
MCV RBC AUTO: 91.5 FL (ref 81.4–97.8)
MICRO URNS: ABNORMAL
MONOCYTES # BLD AUTO: 0.38 K/UL (ref 0–0.85)
MONOCYTES NFR BLD AUTO: 5.9 % (ref 0–13.4)
NEUTROPHILS # BLD AUTO: 3.03 K/UL (ref 1.82–7.42)
NEUTROPHILS NFR BLD: 47.2 % (ref 44–72)
NITRITE UR QL STRIP.AUTO: NEGATIVE
NRBC # BLD AUTO: 0 K/UL
NRBC BLD-RTO: 0 /100 WBC (ref 0–0.2)
PH UR STRIP.AUTO: 5.5 [PH] (ref 5–8)
PLATELET # BLD AUTO: 283 K/UL (ref 164–446)
PMV BLD AUTO: 11 FL (ref 9–12.9)
POTASSIUM SERPL-SCNC: 3.6 MMOL/L (ref 3.6–5.5)
PROT SERPL-MCNC: 7.3 G/DL (ref 6–8.2)
PROT UR QL STRIP: NEGATIVE MG/DL
RBC # BLD AUTO: 4.48 M/UL (ref 4.2–5.4)
RBC UR QL AUTO: NEGATIVE
SODIUM SERPL-SCNC: 139 MMOL/L (ref 135–145)
SP GR UR STRIP.AUTO: 1.03
UROBILINOGEN UR STRIP.AUTO-MCNC: 1 EU/DL
WBC # BLD AUTO: 6.4 K/UL (ref 4.8–10.8)

## 2025-06-09 PROCEDURE — 84703 CHORIONIC GONADOTROPIN ASSAY: CPT

## 2025-06-09 PROCEDURE — 36415 COLL VENOUS BLD VENIPUNCTURE: CPT

## 2025-06-09 PROCEDURE — 81003 URINALYSIS AUTO W/O SCOPE: CPT

## 2025-06-09 PROCEDURE — 99284 EMERGENCY DEPT VISIT MOD MDM: CPT

## 2025-06-09 PROCEDURE — 700102 HCHG RX REV CODE 250 W/ 637 OVERRIDE(OP): Performed by: STUDENT IN AN ORGANIZED HEALTH CARE EDUCATION/TRAINING PROGRAM

## 2025-06-09 PROCEDURE — 85025 COMPLETE CBC W/AUTO DIFF WBC: CPT

## 2025-06-09 PROCEDURE — 80053 COMPREHEN METABOLIC PANEL: CPT

## 2025-06-09 PROCEDURE — 83690 ASSAY OF LIPASE: CPT

## 2025-06-09 PROCEDURE — A9270 NON-COVERED ITEM OR SERVICE: HCPCS | Performed by: STUDENT IN AN ORGANIZED HEALTH CARE EDUCATION/TRAINING PROGRAM

## 2025-06-09 RX ADMIN — LIDOCAINE HYDROCHLORIDE 30 ML: 20 SOLUTION ORAL; TOPICAL at 07:12

## 2025-06-09 ASSESSMENT — PAIN DESCRIPTION - PAIN TYPE
TYPE: ACUTE PAIN
TYPE: ACUTE PAIN

## 2025-06-09 ASSESSMENT — PAIN DESCRIPTION - DESCRIPTORS: DESCRIPTORS: OTHER (COMMENT)

## 2025-06-09 ASSESSMENT — FIBROSIS 4 INDEX: FIB4 SCORE: 0.55

## 2025-06-09 NOTE — PROGRESS NOTES
Pt being discharged to home with no needs. Discharge instructions discussed. All questions answered.  Patient agreeable to discharge plan. Patient has all belongings at time of dc.

## 2025-06-09 NOTE — ED NOTES
**Consult Information** 
Member Facility: [address] [place] [address],[address] Facility MRN: [id number] Consult ID: [id number] Facility Time Zone: ET 
Date and Time of Consult: 05/17/2019 08:02:01 PM 
Requesting Clinician: Nelson Almanza Patient Name: Sterling Soto Date of Birth: [id number] Gender: Female **Clinical Note** Clinical Note: Notified of patient having abdominal pain. Patient is ordered Morphine but wants something less potent. Will order 1 dose of Motrin. Will not order Tylenol as she has a transaminitis per recent lab work. Discussed with nursing. Report given to Marco RN.

## 2025-06-09 NOTE — ED TRIAGE NOTES
Marika Melendez  34 y.o.  female  Chief Complaint   Patient presents with    Abdominal Pain     C/o mid abdominal pain x 2-3 days. Described pain as discomfort. Stated hx of abdominal hernia. N/Zv yesterday, not today. Discomfort worse with bending down.

## 2025-06-09 NOTE — DISCHARGE INSTRUCTIONS
Call to schedule a follow-up appointment with your surgeon, if you develop any worsening abdominal pain vomiting, fevers, stop passing gas or bowel moods return to the ER immediately for recheck

## 2025-06-09 NOTE — ED PROVIDER NOTES
CHIEF COMPLAINT  Chief Complaint   Patient presents with    Abdominal Pain     C/o mid abdominal pain x 2-3 days. Described pain as discomfort. Stated hx of abdominal hernia. N/Zv yesterday, not today. Discomfort worse with bending down.       LIMITATION TO HISTORY   Select: None    HPI    Marika Melendez is a 34 y.o. female who presents to the Emergency Department for evaluation of ongoing mid abdominal pain.  Patient states has been ongoing for several months though worse over the the past few days.  Does have a history of a ventral hernia, was supposed to have surgical repair though was unable to as she has ongoing substance abuse issues.  She has had no fevers no nausea no vomiting no diarrhea.  Does admit to recent methamphetamine use    OUTSIDE HISTORIAN(S):  Select: None    EXTERNAL RECORDS REVIEWED  Select: Other [facility] note reviewed, History of ventral hernia there was a fat and bowel containing abdominal wall hernia in the anterior upper abdomen      PAST MEDICAL HISTORY  Past Medical History[1]  .    SURGICAL HISTORY  Past Surgical History[2]      FAMILY HISTORY  History reviewed. No pertinent family history.       SOCIAL HISTORY  Social History     Socioeconomic History    Marital status: Single     Spouse name: Not on file    Number of children: Not on file    Years of education: Not on file    Highest education level: Not on file   Occupational History    Not on file   Tobacco Use    Smoking status: Never    Smokeless tobacco: Never   Vaping Use    Vaping status: Never Used   Substance and Sexual Activity    Alcohol use: Yes     Comment: occ     Drug use: Not Currently     Types: Methamphetamines, Inhaled     Comment: meth last use 6 years ago 2016    Sexual activity: Yes     Partners: Male   Other Topics Concern    Not on file   Social History Narrative    Not on file     Social Drivers of Health     Financial Resource Strain: Not on file   Food Insecurity: Not on file   Transportation Needs:  "Not on file   Physical Activity: Not on file   Stress: Not on file   Social Connections: Not on file   Intimate Partner Violence: Not on file   Housing Stability: Not on file         CURRENT MEDICATIONS  Medications Ordered Prior to Encounter[3]        ALLERGIES  Allergies[4]    PHYSICAL EXAM  VITAL SIGNS:/74   Pulse 75   Temp 36.3 °C (97.3 °F) (Temporal)   Resp 16   Ht 1.575 m (5' 2\")   Wt 53.8 kg (118 lb 9.7 oz)   LMP 05/30/2025   SpO2 98%   BMI 21.69 kg/m²       VITALS - vital signs documented prior to this note have been reviewed and noted,  GENERAL - awake, alert, oriented, GCS 15, no apparent distress, non-toxic  appearing  HEENT - normocephalic, atraumatic, pupils equal, sclera anicteric, mucus  membranes moist  NECK - supple, no meningismus, full active range of motion, trachea midline  CARDIOVASCULAR - regular rate/rhythm, no murmurs/gallops/rubs  PULMONARY - no respiratory distress, speaking in full sentences, clear to  auscultation bilaterally, no wheezing/ronchi/rales, no accessory muscle use  GASTROINTESTINAL -easily reducible ventral hernia without overlying erythema soft, non-tender, non-distended, no rebound, guarding,  or peritonitis  GENITOURINARY - Deferred  NEUROLOGIC - Awake alert, normal mental status, speech fluid, cognition  normal, moves all extremities  MUSCULOSKELETAL - no obvious asymmetry or deformities present  EXTREMITIES - warm, well-perfused, no cyanosis or significant edema  DERMATOLOGIC - warm, dry, no rashes, no jaundice  PSYCHIATRIC - normal affect, normal insight, normal concentration    DIAGNOSTIC STUDIES / PROCEDURES    LABS  Labs Reviewed   CBC WITH DIFFERENTIAL - Abnormal; Notable for the following components:       Result Value    Lymphocytes 42.10 (*)     All other components within normal limits   COMP METABOLIC PANEL - Abnormal; Notable for the following components:    Glucose 104 (*)     All other components within normal limits   URINALYSIS,CULTURE IF " INDICATED - Abnormal; Notable for the following components:    Ketones Trace (*)     All other components within normal limits   LIPASE   HCG QUAL SERUM   ESTIMATED GFR       There is no leukocytosis LFTs are normal lipase normal doubt cholecystitis ascending cholangitis symptomatically lithiasis or pancreatitis        Radiologist interpretation:   No orders to display        COURSE & MEDICAL DECISION MAKING    ED COURSE:        INTERVENTIONS BY ME:  Medications   GI Cocktail (hyoscyamine-lidocaine-Maalox) oral susp cup 30 mL (30 mL Oral Given 6/9/25 0712)         INITIAL ASSESSMENT, COURSE AND PLAN  Care Narrative: Patient presented for evaluation of ongoing mid abdominal pain near her ventral hernia.  On examination the patient does have an easily reducible ventral hernia without overlying erythema, induration, or significant pain.  Does not seem consistent with a strangulated or incarcerated hernia.  Blood work was obtained there is no leukocytosis LFTs are within normal limits, doubt biliary etiology of her discomfort, lipase is normal, doubt pancreatitis.  I do believe the patient's pain is likely secondary to her known ventral hernia.  At this point given there is no signs of incarceration, strangulation, obstruction, do believe she is appropriate for outpatient management.  Will encourage her to follow-up with her general surgeon and return with any worsening pain or symptoms that she is discharged in stable condition.             ADDITIONAL PROBLEM LIST  Substance abuse  DISPOSITION AND DISCUSSIONS      Escalation of care considered, and ultimately not performed:diagnostic imaging      FINAL DIAGNOSIS  1. Pain of upper abdomen    2. Ventral hernia without obstruction or gangrene             Electronically signed by: Rolo Javier DO ,8:00 AM 06/09/25         [1]   Past Medical History:  Diagnosis Date    Prenatal care insufficient with unknown SALO 5/3/2016   [2]   Past Surgical History:  Procedure  Laterality Date    PRIMARY C SECTION  5/20/2016    Procedure: PRIMARY C SECTION;  Surgeon: Myra Mcallister M.D.;  Location: LABOR AND DELIVERY;  Service:     SOLEDAD BY LAPAROSCOPY  10/30/2012    Performed by Geo Barron M.D. at SURGERY [facility] ORS    TONSILLECTOMY     [3]   No current facility-administered medications on file prior to encounter.     Current Outpatient Medications on File Prior to Encounter   Medication Sig Dispense Refill    azithromycin (ZITHROMAX) 250 MG Tab Take two tabs by mouth on day one, then one tab by mouth daily on days 2-5. 6 Tab 0    ibuprofen (MOTRIN) 600 MG Tab Take 1 Tab by mouth every 6 hours as needed. 30 Tab 0   [4] No Known Allergies

## 2025-06-26 ENCOUNTER — APPOINTMENT (OUTPATIENT)
Dept: ADMISSIONS | Facility: MEDICAL CENTER | Age: 35
End: 2025-06-26
Attending: SURGERY
Payer: COMMERCIAL

## 2025-07-08 ENCOUNTER — PRE-ADMISSION TESTING (OUTPATIENT)
Dept: ADMISSIONS | Facility: MEDICAL CENTER | Age: 35
End: 2025-07-08
Attending: SURGERY
Payer: COMMERCIAL

## 2025-07-17 ENCOUNTER — ANESTHESIA EVENT (OUTPATIENT)
Dept: SURGERY | Facility: MEDICAL CENTER | Age: 35
End: 2025-07-17
Payer: COMMERCIAL

## 2025-07-17 ENCOUNTER — ANESTHESIA (OUTPATIENT)
Dept: SURGERY | Facility: MEDICAL CENTER | Age: 35
End: 2025-07-17
Payer: COMMERCIAL

## 2025-07-17 ENCOUNTER — HOSPITAL ENCOUNTER (OUTPATIENT)
Facility: MEDICAL CENTER | Age: 35
End: 2025-07-17
Attending: SURGERY | Admitting: SURGERY
Payer: COMMERCIAL

## 2025-07-17 VITALS
BODY MASS INDEX: 24.1 KG/M2 | HEIGHT: 62 IN | OXYGEN SATURATION: 95 % | SYSTOLIC BLOOD PRESSURE: 119 MMHG | RESPIRATION RATE: 16 BRPM | WEIGHT: 130.95 LBS | TEMPERATURE: 97.1 F | HEART RATE: 73 BPM | DIASTOLIC BLOOD PRESSURE: 59 MMHG

## 2025-07-17 DIAGNOSIS — G89.18 POST-OP PAIN: Primary | ICD-10-CM

## 2025-07-17 LAB — HCG UR QL: NEGATIVE

## 2025-07-17 PROCEDURE — A9270 NON-COVERED ITEM OR SERVICE: HCPCS | Mod: UD | Performed by: ANESTHESIOLOGY

## 2025-07-17 PROCEDURE — 81025 URINE PREGNANCY TEST: CPT

## 2025-07-17 PROCEDURE — C1781 MESH (IMPLANTABLE): HCPCS | Performed by: SURGERY

## 2025-07-17 PROCEDURE — 700111 HCHG RX REV CODE 636 W/ 250 OVERRIDE (IP): Mod: JZ,UD | Performed by: ANESTHESIOLOGY

## 2025-07-17 PROCEDURE — 160031 HCHG SURGERY MINUTES - 1ST 30 MINS LEVEL 5: Performed by: SURGERY

## 2025-07-17 PROCEDURE — 700101 HCHG RX REV CODE 250: Mod: UD | Performed by: SURGERY

## 2025-07-17 PROCEDURE — 160015 HCHG STAT PREOP MINUTES: Performed by: SURGERY

## 2025-07-17 PROCEDURE — 160193 HCHG PACU STANDARD - 1ST 60 MINS: Performed by: SURGERY

## 2025-07-17 PROCEDURE — 700111 HCHG RX REV CODE 636 W/ 250 OVERRIDE (IP): Mod: UD | Performed by: ANESTHESIOLOGY

## 2025-07-17 PROCEDURE — 700102 HCHG RX REV CODE 250 W/ 637 OVERRIDE(OP): Mod: UD | Performed by: ANESTHESIOLOGY

## 2025-07-17 PROCEDURE — 502714 HCHG ROBOTIC SURGERY SERVICES: Performed by: SURGERY

## 2025-07-17 PROCEDURE — 700111 HCHG RX REV CODE 636 W/ 250 OVERRIDE (IP): Mod: JZ,UD

## 2025-07-17 PROCEDURE — 160042 HCHG SURGERY MINUTES - EA ADDL 1 MIN LEVEL 5: Performed by: SURGERY

## 2025-07-17 PROCEDURE — 700105 HCHG RX REV CODE 258: Mod: UD | Performed by: SURGERY

## 2025-07-17 PROCEDURE — 700101 HCHG RX REV CODE 250: Mod: UD | Performed by: ANESTHESIOLOGY

## 2025-07-17 PROCEDURE — 160025 RECOVERY II MINUTES (STATS): Performed by: SURGERY

## 2025-07-17 PROCEDURE — 160191 HCHG ANESTHESIA STANDARD: Performed by: SURGERY

## 2025-07-17 PROCEDURE — 160048 HCHG OR STATISTICAL LEVEL 1-5: Performed by: SURGERY

## 2025-07-17 PROCEDURE — 160046 HCHG PACU - 1ST 60 MINS PHASE II: Performed by: SURGERY

## 2025-07-17 PROCEDURE — 160002 HCHG RECOVERY MINUTES (STAT): Performed by: SURGERY

## 2025-07-17 DEVICE — MESH VENTRALIGHT ST 4.5IN 1EA/CA: Type: IMPLANTABLE DEVICE | Site: ABDOMEN | Status: FUNCTIONAL

## 2025-07-17 RX ORDER — ROCURONIUM BROMIDE 10 MG/ML
INJECTION, SOLUTION INTRAVENOUS PRN
Status: DISCONTINUED | OUTPATIENT
Start: 2025-07-17 | End: 2025-07-17 | Stop reason: HOSPADM

## 2025-07-17 RX ORDER — HALOPERIDOL 5 MG/ML
1 INJECTION INTRAMUSCULAR
Status: DISCONTINUED | OUTPATIENT
Start: 2025-07-17 | End: 2025-07-17 | Stop reason: HOSPADM

## 2025-07-17 RX ORDER — BUPIVACAINE HYDROCHLORIDE AND EPINEPHRINE 5; 5 MG/ML; UG/ML
INJECTION, SOLUTION EPIDURAL; INTRACAUDAL; PERINEURAL
Status: DISCONTINUED | OUTPATIENT
Start: 2025-07-17 | End: 2025-07-17 | Stop reason: HOSPADM

## 2025-07-17 RX ORDER — CELECOXIB 200 MG/1
200 CAPSULE ORAL 2 TIMES DAILY
Qty: 8 CAPSULE | Refills: 0 | Status: SHIPPED | OUTPATIENT
Start: 2025-07-17 | End: 2025-07-21

## 2025-07-17 RX ORDER — OXYCODONE HYDROCHLORIDE 5 MG/1
5 TABLET ORAL EVERY 6 HOURS PRN
Qty: 12 TABLET | Refills: 0 | Status: SHIPPED | OUTPATIENT
Start: 2025-07-17 | End: 2025-07-20

## 2025-07-17 RX ORDER — ONDANSETRON 2 MG/ML
4 INJECTION INTRAMUSCULAR; INTRAVENOUS
Status: COMPLETED | OUTPATIENT
Start: 2025-07-17 | End: 2025-07-17

## 2025-07-17 RX ORDER — OXYCODONE HCL 5 MG/5 ML
10 SOLUTION, ORAL ORAL
Status: COMPLETED | OUTPATIENT
Start: 2025-07-17 | End: 2025-07-17

## 2025-07-17 RX ORDER — SODIUM CHLORIDE, SODIUM LACTATE, POTASSIUM CHLORIDE, CALCIUM CHLORIDE 600; 310; 30; 20 MG/100ML; MG/100ML; MG/100ML; MG/100ML
INJECTION, SOLUTION INTRAVENOUS CONTINUOUS
Status: ACTIVE | OUTPATIENT
Start: 2025-07-17 | End: 2025-07-17

## 2025-07-17 RX ORDER — METHOCARBAMOL 100 MG/ML
1000 INJECTION, SOLUTION INTRAMUSCULAR; INTRAVENOUS ONCE
Status: COMPLETED | OUTPATIENT
Start: 2025-07-17 | End: 2025-07-17

## 2025-07-17 RX ORDER — GABAPENTIN 300 MG/1
300 CAPSULE ORAL 3 TIMES DAILY
Qty: 15 CAPSULE | Refills: 0 | Status: SHIPPED | OUTPATIENT
Start: 2025-07-17 | End: 2025-07-22

## 2025-07-17 RX ORDER — CEFAZOLIN SODIUM 1 G/3ML
INJECTION, POWDER, FOR SOLUTION INTRAMUSCULAR; INTRAVENOUS PRN
Status: DISCONTINUED | OUTPATIENT
Start: 2025-07-17 | End: 2025-07-17 | Stop reason: SURG

## 2025-07-17 RX ORDER — OXYCODONE HCL 5 MG/5 ML
5 SOLUTION, ORAL ORAL
Status: COMPLETED | OUTPATIENT
Start: 2025-07-17 | End: 2025-07-17

## 2025-07-17 RX ORDER — ONDANSETRON 2 MG/ML
INJECTION INTRAMUSCULAR; INTRAVENOUS PRN
Status: DISCONTINUED | OUTPATIENT
Start: 2025-07-17 | End: 2025-07-17 | Stop reason: SURG

## 2025-07-17 RX ORDER — DEXAMETHASONE SODIUM PHOSPHATE 4 MG/ML
INJECTION, SOLUTION INTRA-ARTICULAR; INTRALESIONAL; INTRAMUSCULAR; INTRAVENOUS; SOFT TISSUE PRN
Status: DISCONTINUED | OUTPATIENT
Start: 2025-07-17 | End: 2025-07-17 | Stop reason: HOSPADM

## 2025-07-17 RX ORDER — HYDROMORPHONE HYDROCHLORIDE 1 MG/ML
0.1 INJECTION, SOLUTION INTRAMUSCULAR; INTRAVENOUS; SUBCUTANEOUS
Status: DISCONTINUED | OUTPATIENT
Start: 2025-07-17 | End: 2025-07-17 | Stop reason: HOSPADM

## 2025-07-17 RX ORDER — SODIUM CHLORIDE, SODIUM LACTATE, POTASSIUM CHLORIDE, CALCIUM CHLORIDE 600; 310; 30; 20 MG/100ML; MG/100ML; MG/100ML; MG/100ML
INJECTION, SOLUTION INTRAVENOUS CONTINUOUS
Status: DISCONTINUED | OUTPATIENT
Start: 2025-07-17 | End: 2025-07-17 | Stop reason: HOSPADM

## 2025-07-17 RX ORDER — HYDRALAZINE HYDROCHLORIDE 20 MG/ML
5 INJECTION INTRAMUSCULAR; INTRAVENOUS
Status: DISCONTINUED | OUTPATIENT
Start: 2025-07-17 | End: 2025-07-17 | Stop reason: HOSPADM

## 2025-07-17 RX ORDER — DIPHENHYDRAMINE HYDROCHLORIDE 50 MG/ML
12.5 INJECTION, SOLUTION INTRAMUSCULAR; INTRAVENOUS
Status: DISCONTINUED | OUTPATIENT
Start: 2025-07-17 | End: 2025-07-17 | Stop reason: HOSPADM

## 2025-07-17 RX ORDER — LIDOCAINE HYDROCHLORIDE 20 MG/ML
INJECTION, SOLUTION EPIDURAL; INFILTRATION; INTRACAUDAL; PERINEURAL PRN
Status: DISCONTINUED | OUTPATIENT
Start: 2025-07-17 | End: 2025-07-17 | Stop reason: SURG

## 2025-07-17 RX ORDER — KETOROLAC TROMETHAMINE 15 MG/ML
15 INJECTION, SOLUTION INTRAMUSCULAR; INTRAVENOUS ONCE
Status: COMPLETED | OUTPATIENT
Start: 2025-07-17 | End: 2025-07-17

## 2025-07-17 RX ORDER — HYDROMORPHONE HYDROCHLORIDE 1 MG/ML
0.4 INJECTION, SOLUTION INTRAMUSCULAR; INTRAVENOUS; SUBCUTANEOUS
Status: DISCONTINUED | OUTPATIENT
Start: 2025-07-17 | End: 2025-07-17 | Stop reason: HOSPADM

## 2025-07-17 RX ORDER — MEPERIDINE HYDROCHLORIDE 50 MG/ML
12.5 INJECTION INTRAMUSCULAR; INTRAVENOUS; SUBCUTANEOUS
Status: DISCONTINUED | OUTPATIENT
Start: 2025-07-17 | End: 2025-07-17 | Stop reason: HOSPADM

## 2025-07-17 RX ORDER — HYDROMORPHONE HYDROCHLORIDE 1 MG/ML
0.2 INJECTION, SOLUTION INTRAMUSCULAR; INTRAVENOUS; SUBCUTANEOUS
Status: DISCONTINUED | OUTPATIENT
Start: 2025-07-17 | End: 2025-07-17 | Stop reason: HOSPADM

## 2025-07-17 RX ORDER — ALBUTEROL SULFATE 5 MG/ML
2.5 SOLUTION RESPIRATORY (INHALATION)
Status: DISCONTINUED | OUTPATIENT
Start: 2025-07-17 | End: 2025-07-17 | Stop reason: HOSPADM

## 2025-07-17 RX ORDER — EPHEDRINE SULFATE 50 MG/ML
5 INJECTION, SOLUTION INTRAVENOUS
Status: DISCONTINUED | OUTPATIENT
Start: 2025-07-17 | End: 2025-07-17 | Stop reason: HOSPADM

## 2025-07-17 RX ADMIN — ROCURONIUM BROMIDE 40 MG: 10 INJECTION INTRAVENOUS at 10:38

## 2025-07-17 RX ADMIN — HYDROMORPHONE HYDROCHLORIDE 0.2 MG: 1 INJECTION, SOLUTION INTRAMUSCULAR; INTRAVENOUS; SUBCUTANEOUS at 12:10

## 2025-07-17 RX ADMIN — PROPOFOL 150 MG: 10 INJECTION, EMULSION INTRAVENOUS at 10:38

## 2025-07-17 RX ADMIN — FENTANYL CITRATE 25 MCG: 50 INJECTION, SOLUTION INTRAMUSCULAR; INTRAVENOUS at 11:50

## 2025-07-17 RX ADMIN — METHOCARBAMOL 1000 MG: 100 INJECTION INTRAMUSCULAR; INTRAVENOUS at 11:48

## 2025-07-17 RX ADMIN — ONDANSETRON 4 MG: 2 INJECTION INTRAMUSCULAR; INTRAVENOUS at 11:43

## 2025-07-17 RX ADMIN — FENTANYL CITRATE 100 MCG: 50 INJECTION, SOLUTION INTRAMUSCULAR; INTRAVENOUS at 10:53

## 2025-07-17 RX ADMIN — CEFAZOLIN 2 G: 1 INJECTION, POWDER, FOR SOLUTION INTRAMUSCULAR; INTRAVENOUS at 10:41

## 2025-07-17 RX ADMIN — FENTANYL CITRATE 100 MCG: 50 INJECTION, SOLUTION INTRAMUSCULAR; INTRAVENOUS at 11:25

## 2025-07-17 RX ADMIN — FENTANYL CITRATE 25 MCG: 50 INJECTION, SOLUTION INTRAMUSCULAR; INTRAVENOUS at 11:45

## 2025-07-17 RX ADMIN — FENTANYL CITRATE 50 MCG: 50 INJECTION, SOLUTION INTRAMUSCULAR; INTRAVENOUS at 11:43

## 2025-07-17 RX ADMIN — DEXAMETHASONE SODIUM PHOSPHATE 8 MG: 4 INJECTION INTRA-ARTICULAR; INTRALESIONAL; INTRAMUSCULAR; INTRAVENOUS; SOFT TISSUE at 10:38

## 2025-07-17 RX ADMIN — SUGAMMADEX 200 MG: 100 INJECTION, SOLUTION INTRAVENOUS at 11:24

## 2025-07-17 RX ADMIN — LIDOCAINE HYDROCHLORIDE 100 MG: 20 INJECTION, SOLUTION EPIDURAL; INFILTRATION; INTRACAUDAL; PERINEURAL at 10:38

## 2025-07-17 RX ADMIN — ONDANSETRON 4 MG: 2 INJECTION INTRAMUSCULAR; INTRAVENOUS at 10:38

## 2025-07-17 RX ADMIN — KETOROLAC TROMETHAMINE 15 MG: 15 INJECTION, SOLUTION INTRAMUSCULAR; INTRAVENOUS at 11:48

## 2025-07-17 RX ADMIN — HYDROMORPHONE HYDROCHLORIDE 0.2 MG: 1 INJECTION, SOLUTION INTRAMUSCULAR; INTRAVENOUS; SUBCUTANEOUS at 12:03

## 2025-07-17 RX ADMIN — SODIUM CHLORIDE, POTASSIUM CHLORIDE, SODIUM LACTATE AND CALCIUM CHLORIDE: 600; 310; 30; 20 INJECTION, SOLUTION INTRAVENOUS at 10:31

## 2025-07-17 RX ADMIN — OXYCODONE HYDROCHLORIDE 10 MG: 5 SOLUTION ORAL at 11:43

## 2025-07-17 ASSESSMENT — PAIN DESCRIPTION - PAIN TYPE
TYPE: SURGICAL PAIN
TYPE: SURGICAL PAIN

## 2025-07-17 ASSESSMENT — PAIN SCALES - GENERAL: PAIN_LEVEL: 4

## 2025-07-17 ASSESSMENT — FIBROSIS 4 INDEX
FIB4 SCORE: 0.66
FIB4 SCORE: 0.66

## 2025-07-17 NOTE — OP REPORT
Operative Report    Date: 7/17/2025    Surgeon: Debra Raines M.D.     Assistant: CARYL Burks  Essentials and assistant throughout the entire case due to its complexity.  Required for managing the robotic instruments, passage of mesh and sutures and closure of the abdomen.    Pre-operative Diagnosis: Recurrent ventral incisional hernia    Post-operative Diagnosis: Same     Procedure: Robotic ventral hernia repair with mesh    Indications: This is a 35 y.o. female who presented with symptoms of recurrent ventral incisional hernia with ultrasound demonstrating recurrence.. Here for repair    Findings: 3 cm ventral incisional hernia in the upper midline containing preperitoneal fat and falciform ligament    Wound Classification: I, Clean..    Procedure in detail: The patient was seen and examined in the preoperative holding area.  The correct side for the surgery was identified with the patient awake and with their input.  This was signed.  The risks benefits and alternatives of the procedure were discussed with the patient who wished to proceed with the procedure as described.  The patient was transferred to the operating room placed in supine position and all pressure points were properly padded.  General endotracheal anesthesia was induced and preoperative antibiotics were given per SCIP protocol.  Patient's abdomen was prepped with ChloraPrep and draped in the normal sterile fashion.  A timeout was performed confirming correct patient, correct procedure, and that all necessary equipment was in the room.      We began the procedure by performing a left upper quadrant Optiview approach.  A 5 mm Visiport trocar was used to enter the abdominal cavity.  The abdomen was insufflated to 15 mmHg.  There was no evidence of injury from trocar insertion.  2 additional trocar sites were marked out 1 handbreadth apart along the left side of the abdomen.  Two 8 mm trocars were placed under direct visualization  and the 5 mm trocar was upsized to an 8 mm trocar.  Robot was docked and robotic instruments were inserted under direct visualization.  No injury from trocar insertion or instrument insertion.    In the anterior abdominal wall there was an obvious recurrent ventral hernia.  The hot scissors were used to incise the peritoneum and dissect the preperitoneal fat up to the base of the hernia.  The hernia contents were then reduced and the hernia sac including the contents was dissected away from the hernia defect.  This was continued in the falciform ligament was ligated with the bipolar.  The falciform ligament was taken partially down away from the anterior abdominal wall to allow for mesh placement.  A ruler was inserted and the hernia defect was measured at 3 cm.  The planned mesh was measured for 11 cm circular and was fashioned in that form.  It was a Ventralight mesh.  The defect was closed with a #1 V-Loc in 2 layers.  The mesh was inserted and a suture passer was used to go through the hernia defect and grasped the Prolene suture in the center of the mesh to hold it up to the abdominal wall with the coated surface towards the abdominal wall and noncoated surface down towards the bowel.  200 strata fix for a total of 3 sutures was used to affix the mesh circumferentially as well as through the middle to the abdominal wall so that it laid flat with good coverage of the hernia defect with at least 3 to 4 cm around each edge of the defect.  There was good hemostasis.  All of the sutures were removed.      The robot was then undocked and the ports were carefully removed after evacuating the pneumoperitoneum.  Skin was then closed with 4-0 Monocryl in a sub-particular fashion and Dermabond was placed over the wounds.  A total of 30 cc of 0.5% Marcaine with epinephrine had been injected at each of the sites throughout the case.    The patient was awakened from general anesthetic, and was taken to the recovery room in  stable condition.    Sponge and needle counts were correct at the end of the case.     Specimen: none    EBL: 10 cc    Dispo: stable, extubated, to PACU    Debra Raines M.D.  [facility]  [phone]

## 2025-07-17 NOTE — OR NURSING
Arrived from PACU AAXO4, Pt's VSS; denies N/V; states pain is at tolerable level. Dressing CDI to abdomen.     D/c orders received. IV dc'd. Pt changed into clothing with assistance. Discharge reviewed, Pt and family verbalized understanding and questions answered. Patient states ready to d/c home. Pt dc'd in w/c with family. Belongings returned to pt denies any report of lost/missing belongings.

## 2025-07-17 NOTE — ANESTHESIA TIME REPORT
Anesthesia Start and Stop Event Times       Date Time Event    7/17/2025 1020 Ready for Procedure     1031 Anesthesia Start     1135 Anesthesia Stop          Responsible Staff  07/17/25      Name Role Begin End    Mal Sullivan M.D. Anesth 1031 1135          Overtime Reason:  no overtime (within assigned shift)    Comments:

## 2025-07-17 NOTE — ANESTHESIA PREPROCEDURE EVALUATION
Case: [id number] Date/Time: 07/17/25 1040    Procedure: ROBOTIC VENTRAL HERNIA REPAIR WITH MESH    Pre-op diagnosis: VENTRAL INCISIONAL HERNIA    Location: [facility] OR 15 / SURGERY [facility]    Surgeons: Debra Raines M.D.            Relevant Problems   No relevant active problems       Physical Exam    Airway   Mallampati: II  TM distance: >3 FB  Neck ROM: full       Cardiovascular - normal exam  Rhythm: regular  Rate: normal    (-) murmur     Dental - normal exam           Pulmonary - normal examBreath sounds clear to auscultation     Abdominal    Neurological - normal exam         Other findings: Subconjunctival hemorrhage of left eye            Anesthesia Plan    ASA 2       Plan - general       Airway plan will be ETT          Induction: intravenous    Postoperative Plan: Postoperative administration of opioids is intended.    Pertinent diagnostic labs and testing reviewed    Informed Consent:    Anesthetic plan and risks discussed with patient.    Use of blood products discussed with: patient whom consented to blood products.

## 2025-07-17 NOTE — DISCHARGE INSTRUCTIONS
HOME CARE INSTRUCTIONS    ACTIVITY: Rest and take it easy for the first 24 hours.  A responsible adult is recommended to remain with you during that time.  It is normal to feel sleepy.  We encourage you to not do anything that requires balance, judgment or coordination.    FOR 24 HOURS DO NOT:  Drive, operate machinery or run household appliances.  Drink beer or alcoholic beverages.  Make important decisions or sign legal documents.    SPECIAL INSTRUCTIONS: Robotic/Laparoscopic Surgery Discharge Instructions:    1. DIET: Upon discharge from the hospital you may resume your normal preoperative diet (if you had a colectomy or surgery on your bowels, then continue soft/low fiber diet). Depending on how you are feeling and whether you have nausea or not, you may wish to stay with a bland diet for the first few days. However, you can advance your diet as quickly as you feel ready.    2. ACTIVITIES: You have a ten pound weight lifting restriction for six weeks after surgery.  This means no lifting anything heavier than a gallon of milk.  Routine activities such as walking and using the stairs are safe.  You may sleep in any position that is comfortable. Avoid strenuous activities and exercise that involves twisting, bending, and, running.    3. DRIVING: You may drive whenever you are off pain medications and are able to perform the activities needed to drive, i.e. turning, bending, twisting, wearing a seat belt, etc.    4. BATHING: You may shower the day following your surgery, but do not submerge in a bath or a pool until you after your first postoperative visit.    5. BOWEL FUNCTION: You may develop either frequent or loose stools after meals. This usually corrects itself after a few days, to a few weeks.  You may try a probiotic like Align.    You may develop constipation. The combination of pain medication and decreased activity level can cause constipation in otherwise normal patients. If you feel this is occurring,  increase your fluid intake and take an over the counter laxative (Milk of Magnesia, Miralax, or Magnesium Citrate).    6. PAIN MEDICATION: A prescription for pain medication may have been sent to your pharmacy. Please take these as directed. It is important to remember not to take medications on an empty stomach as this may cause nausea.  Wean the use of pain medication as soon as possible.  If narcotics were prescribed, try to avoid their use and try non-narcotic medications, such as tylenol first.    7. CALL IF YOU HAVE: (1) Fevers of more than 101 F (38.5 C), (2) New chest or leg pain, (3) Worsening abdominal pain, (4) Persistent vomiting, (5) Large quantity of bleeding, (6) Drainage from incision that is foul smelling or redness or swelling at the incision site.    8. FOLLOW-UP APPOINTMENT: Contact my office at [phone] for a follow-up appointment in 1 to 2 weeks following your procedure.    If you have any additional questions, please do not hesitate to call the office.  Calls will be returned during business hours.      If you feel that you are having an emergency, please present directly to your closest emergency department.    Office address:  [address] [place] [address], [address] [place] [address]      Debra Raines M.D.   [facility]  General Surgery  Colon and Rectal Surgeon     DIET: To avoid nausea, slowly advance diet as tolerated, avoiding spicy or greasy foods for the first day.  Add more substantial food to your diet according to your physician's instructions.  Babies can be fed formula or breast milk as soon as they are hungry.  INCREASE FLUIDS AND FIBER TO AVOID CONSTIPATION.    SURGICAL DRESSING/BATHING: Ok to shower tomorrow, no baths, hot tubs or swimming until cleared by Dr. Raines.    MEDICATIONS: Resume taking daily medication.  Take prescribed pain medication with food.  If no medication is prescribed, you may take non-aspirin pain medication if needed.  PAIN MEDICATION CAN BE VERY  CONSTIPATING.  Take a stool softener or laxative such as senokot, pericolace, or milk of magnesia if needed.    Prescription given for Oxycodone, Celebrex, Gabapentin.  Last pain medication given at 11:43 am.    A follow-up appointment should be arranged with your doctor in 1-2 weeks; call to schedule.    You should CALL YOUR PHYSICIAN if you develop:  Fever greater than 101 degrees F.  Pain not relieved by medication, or persistent nausea or vomiting.  Excessive bleeding (blood soaking through dressing) or unexpected drainage from the wound.  Extreme redness or swelling around the incision site, drainage of pus or foul smelling drainage.  Inability to urinate or empty your bladder within 8 hours.  Problems with breathing or chest pain.    You should call 911 if you develop problems with breathing or chest pain.  If you are unable to contact your doctor or surgical center, you should go to the nearest emergency room or urgent care center.  Physician's telephone #: [phone]    MILD FLU-LIKE SYMPTOMS ARE NORMAL.  YOU MAY EXPERIENCE GENERALIZED MUSCLE ACHES, THROAT IRRITATION, HEADACHE AND/OR SOME NAUSEA.    If any questions arise, call your doctor.  If your doctor is not available, please feel free to call the Surgical Center at [phone].  The Center is open Monday through Friday from 7AM to 7PM.      A registered nurse may call you a few days after your surgery to see how you are doing after your procedure.    You may also receive a survey in the mail within the next two weeks and we ask that you take a few moments to complete the survey and return it to us.  Our goal is to provide you with very good care and we value your comments.     Depression / Suicide Risk    As you are discharged from this [facility] facility, it is important to learn how to keep safe from harming yourself.    Recognize the warning signs:  Abrupt changes in personality, positive or negative- including increase in energy   Giving  away possessions  Change in eating patterns- significant weight changes-  positive or negative  Change in sleeping patterns- unable to sleep or sleeping all the time   Unwillingness or inability to communicate  Depression  Unusual sadness, discouragement and loneliness  Talk of wanting to die  Neglect of personal appearance   Rebelliousness- reckless behavior  Withdrawal from people/activities they love  Confusion- inability to concentrate     If you or a loved one observes any of these behaviors or has concerns about self-harm, here's what you can do:  Talk about it- your feelings and reasons for harming yourself  Remove any means that you might use to hurt yourself (examples: pills, rope, extension cords, firearm)  Get professional help from the community (Mental Health, Substance Abuse, psychological counseling)  Do not be alone:Call your Safe Contact- someone whom you trust who will be there for you.  Call your local CRISIS HOTLINE 456-6969 or 462-883-6339  Call your local Children's Mobile Crisis Response Team [place] (239) 794-8715 or www.InMyRoom  Call the toll free National Suicide Prevention Hotlines   National Suicide Prevention Lifeline 730-072-DSTC (8800)  National Hope Line Network 800-SUICIDE (452-4804)    I acknowledge receipt and understanding of these Home Care instructions.

## 2025-07-17 NOTE — ANESTHESIA POSTPROCEDURE EVALUATION
Patient: Marika Melendez    Procedure Summary       Date: 07/17/25 Room / Location: [place] [address] / SURGERY [facility]    Anesthesia Start: 1031 Anesthesia Stop: 1135    Procedure: ROBOTIC VENTRAL HERNIA REPAIR WITH MESH (Abdomen) Diagnosis: (RECURRENT VENTRAL INCISIONAL HERNIA)    Surgeons: Debra Raines M.D. Responsible Provider: Mal Sullivan M.D.    Anesthesia Type: general ASA Status: 2            Final Anesthesia Type: general  Last vitals  BP   Blood Pressure: 105/52    Temp   36.6 °C (97.8 °F)    Pulse   60   Resp   18    SpO2   98 %      Anesthesia Post Evaluation    Patient location during evaluation: PACU  Patient participation: complete - patient participated  Level of consciousness: awake and alert  Pain score: 4    Airway patency: patent  Anesthetic complications: no  Cardiovascular status: hemodynamically stable  Respiratory status: acceptable  Hydration status: euvolemic    PONV: none          No notable events documented.     Nurse Pain Score: 0 (NPRS)

## 2025-07-17 NOTE — PROGRESS NOTES
Pharmacy Medication Reconciliation      ~Medication reconciliation updated and complete per patient   ~Allergies have been verified and updated   ~No outpatient Antimicrobials in the last 30 days  ~Is dispense history available in EPIC: no  ~Patient home pharmacy :  [facility] [phone]      ~Anticoagulants (rivaroxaban, apixaban, edoxaban, dabigatran, warfarin, enoxaparin) taken in the last 14 days? no

## 2025-07-17 NOTE — ANESTHESIA PROCEDURE NOTES
Airway    Date/Time: 7/17/2025 10:38 AM    Performed by: Mal Sullivan M.D.  Authorized by: Mal Sullivan M.D.    Location:  OR  Urgency:  Elective  Difficult Airway: No    Indications for Airway Management:  Anesthesia      Spontaneous Ventilation: absent    Sedation Level:  Deep  Preoxygenated: Yes    Patient Position:  Sniffing  Mask Difficulty Assessment:  1 - vent by mask  Final Airway Type:  Endotracheal airway  Final Endotracheal Airway:  ETT  Cuffed: Yes    Technique Used for Successful ETT Placement:  Direct laryngoscopy    Insertion Site:  Oral  Blade Type:  Ra  Laryngoscope Blade/Videolaryngoscope Blade Size:  3  ETT Size (mm):  6.5  Measured from:  Lips  ETT to Lips (cm):  24  Placement Verified by: auscultation and capnometry    Cormack-Lehane Classification:  Grade I - full view of glottis  Number of Attempts at Approach:  1  Ventilation Between Attempts:  None  Number of Other Approaches Attempted:  0

## (undated) DEVICE — SLEEVE VASO DVT COMPRESSION CALF MED - (10PR/CA)

## (undated) DEVICE — FORCEPS FENESTRATED BIPOLAR (14UN/EA)

## (undated) DEVICE — LACTATED RINGERS INJ 1000 ML - (14EA/CA 60CA/PF)

## (undated) DEVICE — SUTURE GENERAL

## (undated) DEVICE — ELECTRODE DUAL RETURN W/ CORD - (50/PK)

## (undated) DEVICE — SUTURE 4-0 MONOCRYL PLUS PS-2 - 27 INCH (36/BX)

## (undated) DEVICE — SENSOR OXIMETER ADULT SPO2 RD SET (20EA/BX)

## (undated) DEVICE — TUBE E-T HI-LO CUFF 6.5MM (10EA/BX)

## (undated) DEVICE — DRAPE ARM BOX OF 20

## (undated) DEVICE — DERMABOND ADVANCED - (12EA/BX)

## (undated) DEVICE — GOWN WARMING STANDARD FLEX - (30/CA)

## (undated) DEVICE — NEEDLE DRIVER MEGA SUTURECUT DA VINCI 15X'S REUSABLE

## (undated) DEVICE — DRAPE COLUMN BOX OF 20

## (undated) DEVICE — SUCTION INSTRUMENT YANKAUER BULBOUS TIP W/O VENT (50EA/CA)

## (undated) DEVICE — SUTURE 2-0 20CM STRATAFIX SPIRAL SH NEEDLE (12/BX)

## (undated) DEVICE — TROCAR 5X100 NON BLADED Z-TH - READ KII (6/BX)

## (undated) DEVICE — BIPOLAR FORCE DA VINCI 12X'S REISABLE

## (undated) DEVICE — COVER LIGHT HANDLE ALC PLUS DISP (18EA/BX)

## (undated) DEVICE — SET LEADWIRE 5 LEAD BEDSIDE DISPOSABLE ECG (1SET OF 5/EA)

## (undated) DEVICE — GLOVE BIOGEL PI INDICATOR SZ 6.5 SURGICAL PF LF - (50/BX 4BX/CA)

## (undated) DEVICE — SEAL UNIVERSAL 5MM-12MM (10EA/BX)

## (undated) DEVICE — TUBING CLEARLINK DUO-VENT - C-FLO (48EA/CA)

## (undated) DEVICE — DRAPE STRLE REG TOWEL 18X24 - (10/BX 4BX/CA)"

## (undated) DEVICE — CANISTER SUCTION 3000ML MECHANICAL FILTER AUTO SHUTOFF MEDI-VAC NONSTERILE LF DISP (40EA/CA)

## (undated) DEVICE — SHEARS MONOPOLAR CURVED DA VINCI 10X'S REUSABLE

## (undated) DEVICE — PENCIL ELECTSURG 10FT BTN SWH - (50/CA)

## (undated) DEVICE — GLOVE SZ 6.5 BIOGEL PI MICRO - PF LF (50PR/BX)

## (undated) DEVICE — OBTURATOR BLADELESS STANDARD 8MM (6EA/BX)

## (undated) DEVICE — SET EXTENSION WITH 2 PORTS (48EA/CA) ***PART #2C8610 IS A SUBSTITUTE*****

## (undated) DEVICE — GLOVE BIOGEL PI INDICATOR SZ 7.0 SURGICAL PF LF - (50/BX 4BX/CA)

## (undated) DEVICE — BLADE SURGICAL #15 - (50/BX 3BX/CA)

## (undated) DEVICE — PACK DAVINCI GENERAL (3EA/CA)

## (undated) DEVICE — TROCAR 12X100 SEPARATOR ADV - FIXATION (6/BX)

## (undated) DEVICE — SYSTEM CLEARIFY VISUALIZATION (10EA/PK)

## (undated) DEVICE — COVER TIP ENDOWRIST HOT SHEAR - (10EA/BX) DA VINCI

## (undated) DEVICE — CHLORAPREP 26 ML APPLICATOR - ORANGE TINT(25/CA)

## (undated) DEVICE — DEVICE CLOSURE ABSORBABLE BLUE SYNETURE V-LOC 1 GS-22 L12 IN (12EA/CT)